# Patient Record
Sex: FEMALE | Race: WHITE | Employment: OTHER | URBAN - METROPOLITAN AREA
[De-identification: names, ages, dates, MRNs, and addresses within clinical notes are randomized per-mention and may not be internally consistent; named-entity substitution may affect disease eponyms.]

---

## 2017-01-04 ENCOUNTER — TELEPHONE (OUTPATIENT)
Dept: ENDOCRINOLOGY | Age: 47
End: 2017-01-04

## 2017-02-02 RX ORDER — ROSUVASTATIN CALCIUM 5 MG/1
TABLET, COATED ORAL
Qty: 15 TABLET | Refills: 2 | Status: SHIPPED | OUTPATIENT
Start: 2017-02-02 | End: 2017-05-10 | Stop reason: SDUPTHER

## 2017-03-20 ENCOUNTER — TELEPHONE (OUTPATIENT)
Dept: ENDOCRINOLOGY | Age: 47
End: 2017-03-20

## 2017-03-21 RX ORDER — GLIMEPIRIDE 4 MG/1
4 TABLET ORAL 2 TIMES DAILY
Qty: 60 TABLET | Refills: 3 | Status: SHIPPED | OUTPATIENT
Start: 2017-03-21 | End: 2018-06-13 | Stop reason: ALTCHOICE

## 2017-04-10 ENCOUNTER — OFFICE VISIT (OUTPATIENT)
Dept: ENDOCRINOLOGY | Age: 47
End: 2017-04-10

## 2017-04-10 VITALS
OXYGEN SATURATION: 96 % | WEIGHT: 258 LBS | HEIGHT: 65 IN | HEART RATE: 81 BPM | DIASTOLIC BLOOD PRESSURE: 72 MMHG | BODY MASS INDEX: 42.99 KG/M2 | RESPIRATION RATE: 16 BRPM | SYSTOLIC BLOOD PRESSURE: 119 MMHG

## 2017-04-10 DIAGNOSIS — E11.9 DIABETES MELLITUS WITHOUT COMPLICATION (HCC): Primary | ICD-10-CM

## 2017-04-10 LAB — HBA1C MFR BLD: 8 %

## 2017-04-10 PROCEDURE — 83036 HEMOGLOBIN GLYCOSYLATED A1C: CPT | Performed by: INTERNAL MEDICINE

## 2017-04-10 PROCEDURE — 99213 OFFICE O/P EST LOW 20 MIN: CPT | Performed by: INTERNAL MEDICINE

## 2017-05-09 RX ORDER — BLOOD-GLUCOSE METER
KIT MISCELLANEOUS
Qty: 150 STRIP | Refills: 10 | Status: SHIPPED | OUTPATIENT
Start: 2017-05-09 | End: 2018-04-06 | Stop reason: SDUPTHER

## 2017-05-10 RX ORDER — ROSUVASTATIN CALCIUM 5 MG/1
TABLET, COATED ORAL
Qty: 45 TABLET | Refills: 2 | Status: SHIPPED | OUTPATIENT
Start: 2017-05-10 | End: 2018-02-13 | Stop reason: SDUPTHER

## 2017-06-26 ENCOUNTER — OFFICE VISIT (OUTPATIENT)
Dept: ENT CLINIC | Age: 47
End: 2017-06-26

## 2017-06-26 VITALS
TEMPERATURE: 98.3 F | SYSTOLIC BLOOD PRESSURE: 120 MMHG | WEIGHT: 266 LBS | DIASTOLIC BLOOD PRESSURE: 79 MMHG | BODY MASS INDEX: 44.32 KG/M2 | HEIGHT: 65 IN

## 2017-06-26 DIAGNOSIS — M77.9 STYLOHYOID TENDONITIS: ICD-10-CM

## 2017-06-26 DIAGNOSIS — R07.0 THROAT PAIN: Primary | ICD-10-CM

## 2017-06-26 PROCEDURE — 99203 OFFICE O/P NEW LOW 30 MIN: CPT | Performed by: OTOLARYNGOLOGY

## 2018-02-03 LAB
AVERAGE GLUCOSE: NORMAL
HBA1C MFR BLD: 8.9 %

## 2018-02-13 RX ORDER — ROSUVASTATIN CALCIUM 5 MG/1
TABLET, COATED ORAL
Qty: 15 TABLET | Refills: 2 | Status: SHIPPED | OUTPATIENT
Start: 2018-02-13 | End: 2018-04-28 | Stop reason: SDUPTHER

## 2018-04-06 ENCOUNTER — OFFICE VISIT (OUTPATIENT)
Dept: ENDOCRINOLOGY | Age: 48
End: 2018-04-06

## 2018-04-06 VITALS
HEART RATE: 86 BPM | WEIGHT: 258.9 LBS | DIASTOLIC BLOOD PRESSURE: 76 MMHG | SYSTOLIC BLOOD PRESSURE: 111 MMHG | OXYGEN SATURATION: 97 % | BODY MASS INDEX: 40.55 KG/M2 | TEMPERATURE: 97.6 F

## 2018-04-06 DIAGNOSIS — I10 ESSENTIAL HYPERTENSION: ICD-10-CM

## 2018-04-06 DIAGNOSIS — E11.9 DIABETES MELLITUS WITHOUT COMPLICATION (HCC): Primary | ICD-10-CM

## 2018-04-06 LAB — HBA1C MFR BLD: 10.9 %

## 2018-04-06 PROCEDURE — 3046F HEMOGLOBIN A1C LEVEL >9.0%: CPT | Performed by: INTERNAL MEDICINE

## 2018-04-06 PROCEDURE — 83036 HEMOGLOBIN GLYCOSYLATED A1C: CPT | Performed by: INTERNAL MEDICINE

## 2018-04-06 PROCEDURE — 4004F PT TOBACCO SCREEN RCVD TLK: CPT | Performed by: INTERNAL MEDICINE

## 2018-04-06 PROCEDURE — G8427 DOCREV CUR MEDS BY ELIG CLIN: HCPCS | Performed by: INTERNAL MEDICINE

## 2018-04-06 PROCEDURE — 99215 OFFICE O/P EST HI 40 MIN: CPT | Performed by: INTERNAL MEDICINE

## 2018-04-06 PROCEDURE — G8417 CALC BMI ABV UP PARAM F/U: HCPCS | Performed by: INTERNAL MEDICINE

## 2018-04-06 RX ORDER — BLOOD-GLUCOSE METER
KIT MISCELLANEOUS
Qty: 1 DEVICE | Refills: 0 | Status: SHIPPED | OUTPATIENT
Start: 2018-04-06

## 2018-04-06 RX ORDER — LANCETS 28 GAUGE
EACH MISCELLANEOUS
Qty: 100 EACH | Refills: 3 | Status: SHIPPED | OUTPATIENT
Start: 2018-04-06

## 2018-04-30 RX ORDER — ROSUVASTATIN CALCIUM 5 MG/1
TABLET, COATED ORAL
Qty: 15 TABLET | Refills: 11 | Status: SHIPPED | OUTPATIENT
Start: 2018-04-30 | End: 2019-05-07 | Stop reason: SDUPTHER

## 2018-05-09 ENCOUNTER — HOSPITAL ENCOUNTER (OUTPATIENT)
Dept: OTHER | Age: 48
Discharge: HOME | End: 2018-05-09
Attending: NURSE PRACTITIONER | Admitting: NURSE PRACTITIONER

## 2018-05-09 ENCOUNTER — OFFICE VISIT (OUTPATIENT)
Dept: ENDOCRINOLOGY | Age: 48
End: 2018-05-09

## 2018-05-09 VITALS
HEIGHT: 67 IN | BODY MASS INDEX: 37.09 KG/M2 | WEIGHT: 236.3 LBS | DIASTOLIC BLOOD PRESSURE: 87 MMHG | HEART RATE: 90 BPM | OXYGEN SATURATION: 98 % | SYSTOLIC BLOOD PRESSURE: 132 MMHG

## 2018-05-09 DIAGNOSIS — E11.9 DIABETES MELLITUS WITHOUT COMPLICATION (HCC): Primary | ICD-10-CM

## 2018-05-09 DIAGNOSIS — I10 ESSENTIAL HYPERTENSION: ICD-10-CM

## 2018-05-09 DIAGNOSIS — E11.9 DIABETES MELLITUS WITHOUT COMPLICATION (HCC): ICD-10-CM

## 2018-05-09 DIAGNOSIS — E78.2 MIXED HYPERLIPIDEMIA: ICD-10-CM

## 2018-05-09 LAB
CREATININE URINE: 73.6 MG/DL (ref 28–259)
MICROALBUMIN UR-MCNC: <1.2 MG/DL
MICROALBUMIN/CREAT UR-RTO: NORMAL MG/G (ref 0–30)

## 2018-05-09 PROCEDURE — G8427 DOCREV CUR MEDS BY ELIG CLIN: HCPCS | Performed by: NURSE PRACTITIONER

## 2018-05-09 PROCEDURE — 99214 OFFICE O/P EST MOD 30 MIN: CPT | Performed by: NURSE PRACTITIONER

## 2018-05-09 PROCEDURE — 4004F PT TOBACCO SCREEN RCVD TLK: CPT | Performed by: NURSE PRACTITIONER

## 2018-05-09 PROCEDURE — 3046F HEMOGLOBIN A1C LEVEL >9.0%: CPT | Performed by: NURSE PRACTITIONER

## 2018-05-09 PROCEDURE — G8417 CALC BMI ABV UP PARAM F/U: HCPCS | Performed by: NURSE PRACTITIONER

## 2018-05-09 PROCEDURE — 2022F DILAT RTA XM EVC RTNOPTHY: CPT | Performed by: NURSE PRACTITIONER

## 2018-05-09 ASSESSMENT — ENCOUNTER SYMPTOMS
CONSTIPATION: 0
BACK PAIN: 1
DIARRHEA: 0
COLOR CHANGE: 0
EYE PAIN: 0
SHORTNESS OF BREATH: 0
NAUSEA: 0

## 2018-05-10 ENCOUNTER — TELEPHONE (OUTPATIENT)
Dept: ENDOCRINOLOGY | Age: 48
End: 2018-05-10

## 2018-05-11 ENCOUNTER — TELEPHONE (OUTPATIENT)
Dept: ENDOCRINOLOGY | Age: 48
End: 2018-05-11

## 2018-05-11 DIAGNOSIS — E11.9 DIABETES MELLITUS WITHOUT COMPLICATION (HCC): Primary | ICD-10-CM

## 2018-06-12 RX ORDER — ACETAMINOPHEN 325 MG/1
650 TABLET ORAL
COMMUNITY
Start: 2018-02-04 | End: 2018-06-13 | Stop reason: ALTCHOICE

## 2018-06-12 RX ORDER — CEPHALEXIN 500 MG/1
CAPSULE ORAL
COMMUNITY
Start: 2018-03-05 | End: 2018-06-13 | Stop reason: ALTCHOICE

## 2018-06-12 RX ORDER — AMOXICILLIN 250 MG
1-3 CAPSULE ORAL
COMMUNITY
Start: 2018-02-04 | End: 2018-06-13 | Stop reason: ALTCHOICE

## 2018-06-12 RX ORDER — TRAMADOL HYDROCHLORIDE 50 MG/1
50 TABLET ORAL PRN
COMMUNITY
Start: 2018-04-18 | End: 2018-08-08

## 2018-06-13 ENCOUNTER — OFFICE VISIT (OUTPATIENT)
Dept: ENDOCRINOLOGY | Age: 48
End: 2018-06-13

## 2018-06-13 VITALS
OXYGEN SATURATION: 97 % | BODY MASS INDEX: 40.74 KG/M2 | WEIGHT: 259.6 LBS | HEART RATE: 92 BPM | DIASTOLIC BLOOD PRESSURE: 80 MMHG | HEIGHT: 67 IN | SYSTOLIC BLOOD PRESSURE: 124 MMHG

## 2018-06-13 DIAGNOSIS — E11.9 DIABETES MELLITUS WITHOUT COMPLICATION (HCC): ICD-10-CM

## 2018-06-13 DIAGNOSIS — E55.9 VITAMIN D DEFICIENCY: ICD-10-CM

## 2018-06-13 DIAGNOSIS — E78.2 MIXED HYPERLIPIDEMIA: ICD-10-CM

## 2018-06-13 DIAGNOSIS — E66.01 MORBID OBESITY (HCC): Primary | ICD-10-CM

## 2018-06-13 PROCEDURE — G8417 CALC BMI ABV UP PARAM F/U: HCPCS | Performed by: NURSE PRACTITIONER

## 2018-06-13 PROCEDURE — 96160 PT-FOCUSED HLTH RISK ASSMT: CPT | Performed by: NURSE PRACTITIONER

## 2018-06-13 PROCEDURE — 2022F DILAT RTA XM EVC RTNOPTHY: CPT | Performed by: NURSE PRACTITIONER

## 2018-06-13 PROCEDURE — 99214 OFFICE O/P EST MOD 30 MIN: CPT | Performed by: NURSE PRACTITIONER

## 2018-06-13 PROCEDURE — G8427 DOCREV CUR MEDS BY ELIG CLIN: HCPCS | Performed by: NURSE PRACTITIONER

## 2018-06-13 PROCEDURE — 4004F PT TOBACCO SCREEN RCVD TLK: CPT | Performed by: NURSE PRACTITIONER

## 2018-06-13 PROCEDURE — 3046F HEMOGLOBIN A1C LEVEL >9.0%: CPT | Performed by: NURSE PRACTITIONER

## 2018-06-13 RX ORDER — PHENTERMINE HYDROCHLORIDE 37.5 MG/1
37.5 TABLET ORAL
Qty: 30 TABLET | Refills: 0 | Status: SHIPPED | OUTPATIENT
Start: 2018-06-13 | End: 2018-07-06 | Stop reason: SDUPTHER

## 2018-06-13 ASSESSMENT — PATIENT HEALTH QUESTIONNAIRE - PHQ9
8. MOVING OR SPEAKING SO SLOWLY THAT OTHER PEOPLE COULD HAVE NOTICED. OR THE OPPOSITE, BEING SO FIGETY OR RESTLESS THAT YOU HAVE BEEN MOVING AROUND A LOT MORE THAN USUAL: 2
6. FEELING BAD ABOUT YOURSELF - OR THAT YOU ARE A FAILURE OR HAVE LET YOURSELF OR YOUR FAMILY DOWN: 3
5. POOR APPETITE OR OVEREATING: 2
3. TROUBLE FALLING OR STAYING ASLEEP: 3
SUM OF ALL RESPONSES TO PHQ9 QUESTIONS 1 & 2: 4
2. FEELING DOWN, DEPRESSED OR HOPELESS: 3
9. THOUGHTS THAT YOU WOULD BE BETTER OFF DEAD, OR OF HURTING YOURSELF: 0
1. LITTLE INTEREST OR PLEASURE IN DOING THINGS: 1
4. FEELING TIRED OR HAVING LITTLE ENERGY: 3
10. IF YOU CHECKED OFF ANY PROBLEMS, HOW DIFFICULT HAVE THESE PROBLEMS MADE IT FOR YOU TO DO YOUR WORK, TAKE CARE OF THINGS AT HOME, OR GET ALONG WITH OTHER PEOPLE: 2
SUM OF ALL RESPONSES TO PHQ QUESTIONS 1-9: 17
7. TROUBLE CONCENTRATING ON THINGS, SUCH AS READING THE NEWSPAPER OR WATCHING TELEVISION: 0

## 2018-06-13 ASSESSMENT — ENCOUNTER SYMPTOMS
EYE PAIN: 0
BACK PAIN: 1
CONSTIPATION: 0
NAUSEA: 0
DIARRHEA: 0
SHORTNESS OF BREATH: 0
COLOR CHANGE: 0

## 2018-07-06 ENCOUNTER — TELEPHONE (OUTPATIENT)
Dept: ENDOCRINOLOGY | Age: 48
End: 2018-07-06

## 2018-07-06 ENCOUNTER — OFFICE VISIT (OUTPATIENT)
Dept: ENDOCRINOLOGY | Age: 48
End: 2018-07-06

## 2018-07-06 VITALS
HEART RATE: 87 BPM | SYSTOLIC BLOOD PRESSURE: 116 MMHG | DIASTOLIC BLOOD PRESSURE: 71 MMHG | WEIGHT: 256 LBS | BODY MASS INDEX: 40.1 KG/M2 | OXYGEN SATURATION: 97 %

## 2018-07-06 DIAGNOSIS — E66.01 MORBID OBESITY (HCC): ICD-10-CM

## 2018-07-06 DIAGNOSIS — E11.9 DIABETES MELLITUS WITHOUT COMPLICATION (HCC): Primary | ICD-10-CM

## 2018-07-06 DIAGNOSIS — E78.2 MIXED HYPERLIPIDEMIA: ICD-10-CM

## 2018-07-06 LAB — HBA1C MFR BLD: 11.1 %

## 2018-07-06 PROCEDURE — 83036 HEMOGLOBIN GLYCOSYLATED A1C: CPT | Performed by: INTERNAL MEDICINE

## 2018-07-06 PROCEDURE — G8427 DOCREV CUR MEDS BY ELIG CLIN: HCPCS | Performed by: INTERNAL MEDICINE

## 2018-07-06 PROCEDURE — G8417 CALC BMI ABV UP PARAM F/U: HCPCS | Performed by: INTERNAL MEDICINE

## 2018-07-06 PROCEDURE — 4004F PT TOBACCO SCREEN RCVD TLK: CPT | Performed by: INTERNAL MEDICINE

## 2018-07-06 PROCEDURE — 2022F DILAT RTA XM EVC RTNOPTHY: CPT | Performed by: INTERNAL MEDICINE

## 2018-07-06 PROCEDURE — 99214 OFFICE O/P EST MOD 30 MIN: CPT | Performed by: INTERNAL MEDICINE

## 2018-07-06 PROCEDURE — 3046F HEMOGLOBIN A1C LEVEL >9.0%: CPT | Performed by: INTERNAL MEDICINE

## 2018-07-06 RX ORDER — PHENTERMINE HYDROCHLORIDE 37.5 MG/1
37.5 TABLET ORAL
Qty: 30 TABLET | Refills: 0 | Status: SHIPPED | OUTPATIENT
Start: 2018-07-06 | End: 2018-08-05

## 2018-07-06 NOTE — TELEPHONE ENCOUNTER
Pt called said pharmacy said they still haven't recieved the adipex prescription please send to [facility] in [place]

## 2018-07-06 NOTE — PROGRESS NOTES
mouth daily      hydrochlorothiazide (HYDRODIURIL) 50 MG tablet Take 50 mg by mouth daily      buPROPion 150 MG SR tablet       fluocinonide (LIDEX) 0.05 % ointment Apply sparingly to affected area(s) up to bid prn 60 g 0    lisinopril (PRINIVIL;ZESTRIL) 40 MG tablet Take 1 tablet by mouth daily 30 tablet 3         Vitals:    07/06/18 1300   BP: 116/71   Site: Right Arm   Position: Sitting   Cuff Size: Large Adult   Pulse: 87   SpO2: 97%   Weight: 256 lb (116.1 kg)     Body mass index is 40.1 kg/m². Wt Readings from Last 3 Encounters:   07/06/18 256 lb (116.1 kg)   06/13/18 259 lb 9.6 oz (117.8 kg)   05/09/18 236 lb 4.8 oz (107.2 kg)     BP Readings from Last 3 Encounters:   07/06/18 116/71   06/13/18 124/80   05/09/18 132/87        Past Medical History:   Diagnosis Date    Anxiety     Hypertension     Ovarian cyst     Type II or unspecified type diabetes mellitus without mention of complication, not stated as uncontrolled      Past Surgical History:   Procedure Laterality Date    APPENDECTOMY      CHOLECYSTECTOMY      DILATION AND CURETTAGE OF UTERUS      ENDOMETRIAL ABLATION      MIDDLE EAR SURGERY  10/2002    left ossicular reconstrucion    TUBAL LIGATION       History reviewed. No pertinent family history. History   Smoking Status    Current Every Day Smoker    Packs/day: 1.00   Smokeless Tobacco    Never Used      History   Alcohol Use    Yes     Comment: rare       HPI      Tisha Flores is a 57 y.o. female who is here for a follow-up for management of DM. She was previously living in [place], [place]. Patient has a PMH of Type 2 DM, hypertension, hyperlipidemia, obesity. Diagnosed with Diabetes Mellitus type 2 in [id number]. Course has been variable . Microvascular complications: No known retinopathy (Last eye exam:  2016), No nephropathy .    No Peripheral neuropathy    Home regimen:  Amaryl 4 mg BID    basaglar 46 units at night  Novolog 15 units TID + SSI Previous medications: Metformin /jannmet ( diarrhea). Byetta ( abdominal pain). Amaryl 8 mg daily. Tolerated Bydureon without side effects. Victoza ( GI side effects)    -400      Diet: Eats 3  Meals/day. Had Nutrition education in 2014. Has decreased the soda. Exercise: getting PT for back pain. Hyperlipidemia: Currently is on pravastatin 40 mg daily. Hypertension: Currently on lisinopril 40 mg daily. Review of Systems   Constitutional: Positive for malaise/fatigue. Negative for fever, chills, weight loss and diaphoresis. Eyes: Negative for blurred vision, double vision and photophobia. Respiratory: Negative for cough and hemoptysis. Cardiovascular: Negative for chest pain, palpitations and orthopnea. Genitourinary: Negative for dysuria, urgency, frequency, hematuria and flank pain. Musculoskeletal: Positive for myalgias. Negative for back pain, joint pain, falls and neck pain. Skin: Negative for itching and rash. Neurological: Positive for headaches. Negative for dizziness, tingling, tremors, sensory change, speech change, focal weakness, seizures and loss of consciousness. Endo/Heme/Allergies: Negative for environmental allergies and polydipsia. Does not bruise/bleed easily. Psychiatric/Behavioral: Negative for depression, suicidal ideas, hallucinations, memory loss and substance abuse. The patient is not nervous/anxious and does not have insomnia. Physical Exam   Constitutional: She is oriented to person, place, and time. She appears well-developed and well-nourished. HENT:   Head: Normocephalic. Mouth/Throat: Oropharynx is clear and moist.   Eyes: EOM are normal. Right eye exhibits no discharge. Neck: No thyromegaly present. Cardiovascular: Normal rate and normal heart sounds. Pulmonary/Chest: Effort normal and breath sounds normal.   Abdominal: Soft. She exhibits no distension. There is no tenderness.    Musculoskeletal: She exhibits no tenderness. Neurological: She is alert and oriented to person, place, and time. Skin: Skin is warm. No rash noted. She is not diaphoretic. Psychiatric: Her behavior is normal.             Office Visit on 07/06/2018   Component Date Value Ref Range Status    Hemoglobin A1C 07/06/2018 11.1  % Final        Assessment/Plan    1. Type 2 DM     López Massey is a 57 y.o. female has Type 2 DM with obesity and insulin resistance. Poorly controlled. A1c 9.7 % --->9.2 % ---> 8.4 % --->7.4 % ---> 7.8 %---> 8.5 %-->9.8 %---> 8 %---> 10.9 % ---> 11.1 %          She stopped drinking soda but has started drinking lemonade ! Again discussed that without her not sticking to low sugar diet. Advised to stop drinking all forms of sugary drinks    -Continue amaryl 4 mg BID   -Increase basaglar 55 units at night  -Increase Novolog 18 units TID with meals + SSI  -     Advised follow-up with the ophthalmologist once year. Urine microalbumin/cr ratio was normal in 02/15, 12/15, 09/16. Discussed foot care. Foot exam was done by PCP in 12/14. ASA 81 mg daily. Smoker. Discussed smoking cessation. 2. Hypertension. BP at goal    3. Hyperlipidemia. On pravastatin  20 mg daily. Lipids at goal ( 09/16)    4. Psoriasis. As per   Dr. Vandana Fabian. 5. Neuropathy. Used neurontin but had headaches. On Lyrica 50 mg BID       6. Obesity. On adipex. Lost 3 lbs. Will follow-up.

## 2018-07-09 ENCOUNTER — HOSPITAL ENCOUNTER (OUTPATIENT)
Dept: GENERAL RADIOLOGY | Age: 48
Discharge: HOME | End: 2018-07-09
Attending: NURSE PRACTITIONER | Admitting: NURSE PRACTITIONER

## 2018-07-09 DIAGNOSIS — E11.9 DIABETES MELLITUS WITHOUT COMPLICATION (HCC): ICD-10-CM

## 2018-07-09 DIAGNOSIS — E78.2 MIXED HYPERLIPIDEMIA: ICD-10-CM

## 2018-07-09 DIAGNOSIS — E66.01 MORBID OBESITY (HCC): ICD-10-CM

## 2018-07-09 DIAGNOSIS — E55.9 VITAMIN D DEFICIENCY: ICD-10-CM

## 2018-07-09 LAB
A/G RATIO: 1.1 (ref 1.1–2.2)
ALBUMIN SERPL-MCNC: 3.5 G/DL (ref 3.4–5)
ALP BLD-CCNC: 109 U/L (ref 40–129)
ALT SERPL-CCNC: 16 U/L (ref 10–40)
ANION GAP SERPL CALCULATED.3IONS-SCNC: 11 MMOL/L (ref 3–16)
AST SERPL-CCNC: 16 U/L (ref 15–37)
BILIRUB SERPL-MCNC: 0.3 MG/DL (ref 0–1)
BUN BLDV-MCNC: 9 MG/DL (ref 7–20)
CALCIUM SERPL-MCNC: 9.3 MG/DL (ref 8.3–10.6)
CHLORIDE BLD-SCNC: 106 MMOL/L (ref 99–110)
CHOLESTEROL, TOTAL: 126 MG/DL (ref 0–199)
CO2: 27 MMOL/L (ref 21–32)
CREAT SERPL-MCNC: <0.5 MG/DL (ref 0.6–1.1)
GFR AFRICAN AMERICAN: >60
GFR NON-AFRICAN AMERICAN: >60
GLOBULIN: 3.3 G/DL
GLUCOSE BLD-MCNC: 118 MG/DL (ref 70–99)
HDLC SERPL-MCNC: 39 MG/DL (ref 40–60)
LDL CHOLESTEROL CALCULATED: 59 MG/DL
POTASSIUM SERPL-SCNC: 4.4 MMOL/L (ref 3.5–5.1)
SODIUM BLD-SCNC: 144 MMOL/L (ref 136–145)
TOTAL PROTEIN: 6.8 G/DL (ref 6.4–8.2)
TRIGL SERPL-MCNC: 142 MG/DL (ref 0–150)
TSH REFLEX FT4: 3.84 UIU/ML (ref 0.27–4.2)
VITAMIN D 25-HYDROXY: 16.8 NG/ML
VLDLC SERPL CALC-MCNC: 28 MG/DL

## 2018-07-11 LAB — C-PEPTIDE: 2.1 NG/ML (ref 1.1–4.4)

## 2018-07-19 ENCOUNTER — TELEPHONE (OUTPATIENT)
Dept: ENDOCRINOLOGY | Age: 48
End: 2018-07-19

## 2018-07-19 DIAGNOSIS — E55.9 VITAMIN D DEFICIENCY: Primary | ICD-10-CM

## 2018-07-19 RX ORDER — ERGOCALCIFEROL (VITAMIN D2) 1250 MCG
50000 CAPSULE ORAL WEEKLY
Qty: 12 CAPSULE | Refills: 1 | Status: SHIPPED | OUTPATIENT
Start: 2018-07-19 | End: 2019-01-11 | Stop reason: SDUPTHER

## 2018-07-30 RX ORDER — INSULIN LISPRO 100 U/ML
18-26 INJECTION, SOLUTION SUBCUTANEOUS
Qty: 30 ML | Refills: 3 | Status: SHIPPED | OUTPATIENT
Start: 2018-07-30 | End: 2019-01-25 | Stop reason: SDUPTHER

## 2018-07-30 RX ORDER — INSULIN LISPRO 100 U/ML
INJECTION, SOLUTION SUBCUTANEOUS
COMMUNITY
End: 2018-07-30 | Stop reason: SDUPTHER

## 2018-08-08 ENCOUNTER — OFFICE VISIT (OUTPATIENT)
Dept: ENDOCRINOLOGY | Age: 48
End: 2018-08-08

## 2018-08-08 VITALS
HEART RATE: 90 BPM | DIASTOLIC BLOOD PRESSURE: 78 MMHG | WEIGHT: 253.6 LBS | BODY MASS INDEX: 39.8 KG/M2 | SYSTOLIC BLOOD PRESSURE: 128 MMHG | HEIGHT: 67 IN | OXYGEN SATURATION: 96 %

## 2018-08-08 DIAGNOSIS — E11.9 DIABETES MELLITUS WITHOUT COMPLICATION (HCC): Primary | ICD-10-CM

## 2018-08-08 DIAGNOSIS — E55.9 VITAMIN D DEFICIENCY: ICD-10-CM

## 2018-08-08 DIAGNOSIS — I10 ESSENTIAL HYPERTENSION: ICD-10-CM

## 2018-08-08 DIAGNOSIS — F17.200 TOBACCO DEPENDENCE: ICD-10-CM

## 2018-08-08 DIAGNOSIS — E78.2 MIXED HYPERLIPIDEMIA: ICD-10-CM

## 2018-08-08 DIAGNOSIS — E66.01 MORBID OBESITY (HCC): ICD-10-CM

## 2018-08-08 PROCEDURE — G8417 CALC BMI ABV UP PARAM F/U: HCPCS | Performed by: NURSE PRACTITIONER

## 2018-08-08 PROCEDURE — 2022F DILAT RTA XM EVC RTNOPTHY: CPT | Performed by: NURSE PRACTITIONER

## 2018-08-08 PROCEDURE — 96160 PT-FOCUSED HLTH RISK ASSMT: CPT | Performed by: NURSE PRACTITIONER

## 2018-08-08 PROCEDURE — G8427 DOCREV CUR MEDS BY ELIG CLIN: HCPCS | Performed by: NURSE PRACTITIONER

## 2018-08-08 PROCEDURE — 3046F HEMOGLOBIN A1C LEVEL >9.0%: CPT | Performed by: NURSE PRACTITIONER

## 2018-08-08 PROCEDURE — 99214 OFFICE O/P EST MOD 30 MIN: CPT | Performed by: NURSE PRACTITIONER

## 2018-08-08 PROCEDURE — 4004F PT TOBACCO SCREEN RCVD TLK: CPT | Performed by: NURSE PRACTITIONER

## 2018-08-08 RX ORDER — PHENTERMINE HYDROCHLORIDE 37.5 MG/1
37.5 CAPSULE ORAL EVERY MORNING
Qty: 30 CAPSULE | Refills: 0 | Status: SHIPPED | OUTPATIENT
Start: 2018-08-08 | End: 2018-09-07

## 2018-08-08 ASSESSMENT — PATIENT HEALTH QUESTIONNAIRE - PHQ9
5. POOR APPETITE OR OVEREATING: 0
2. FEELING DOWN, DEPRESSED OR HOPELESS: 2
SUM OF ALL RESPONSES TO PHQ9 QUESTIONS 1 & 2: 4
7. TROUBLE CONCENTRATING ON THINGS, SUCH AS READING THE NEWSPAPER OR WATCHING TELEVISION: 0
SUM OF ALL RESPONSES TO PHQ QUESTIONS 1-9: 9
4. FEELING TIRED OR HAVING LITTLE ENERGY: 2
10. IF YOU CHECKED OFF ANY PROBLEMS, HOW DIFFICULT HAVE THESE PROBLEMS MADE IT FOR YOU TO DO YOUR WORK, TAKE CARE OF THINGS AT HOME, OR GET ALONG WITH OTHER PEOPLE: 1
6. FEELING BAD ABOUT YOURSELF - OR THAT YOU ARE A FAILURE OR HAVE LET YOURSELF OR YOUR FAMILY DOWN: 1
9. THOUGHTS THAT YOU WOULD BE BETTER OFF DEAD, OR OF HURTING YOURSELF: 0
1. LITTLE INTEREST OR PLEASURE IN DOING THINGS: 2
8. MOVING OR SPEAKING SO SLOWLY THAT OTHER PEOPLE COULD HAVE NOTICED. OR THE OPPOSITE, BEING SO FIGETY OR RESTLESS THAT YOU HAVE BEEN MOVING AROUND A LOT MORE THAN USUAL: 0
3. TROUBLE FALLING OR STAYING ASLEEP: 2
SUM OF ALL RESPONSES TO PHQ QUESTIONS 1-9: 9

## 2018-08-08 ASSESSMENT — ENCOUNTER SYMPTOMS
CONSTIPATION: 0
SHORTNESS OF BREATH: 0
EYE PAIN: 0
COLOR CHANGE: 0
DIARRHEA: 0
BACK PAIN: 1
NAUSEA: 0

## 2018-08-08 NOTE — PROGRESS NOTES
Pt returned call, left me a vm. I called him back and lvm about there was a counter reaction with the medication he was going to send in for his rash. He sent in Lotrisone cream instead.    09/14/2016    .4 12/10/2015    .7 01/12/2015     Current Medication regimen:   Lantus 55 units QHS --> takes every night and continues to titrate  Novolog 18 units TID with meals + moderate dose regimen SSI--> takes 3 times a day    Other meds tried:  Metformin and janumet, victoza-> has severe abdominal pain and GI s/e: diarrhea, abdominal cramps and nausea. Microvascular Complications:  · Neuropathy Tingling and burning sensation in the toes, on a different  Muscle relaxant. · Retinopathy : blurry vision improved. Wears glasses at night to drive  · Nephropathy Urine microalbumin/cr ratio was normal in 02/15, 12/15, 09/16. Component    Latest Ref Rng & Units 5/9/2018   Microalbumin, Random Urine     <2.0 mg/dL <1.20   Creatinine, Ur     28.0 - 259.0 mg/dL 73.6   Microalbumin Creatinine Ratio     0.0 - 30.0 mg/g see below     Diabetic Health Maintenance   · Last Eye Exam: 2016; scheduled for next visit next month  · Last Foot exam: 2014   · Has patient seen a dietitian? Yes, several years ago  · Current Exercise: No structured exercise due to back pain, can walk for only 15 minutes before legs feel numb. · On ACEI or ARB:lisinopril 40 mg  · On statin: Crestor 5mg    Risk Factors  · Smoker: current everyday 1ppd smoker  · ETOH: rare  · Co-morbid conditions HTN, HLD, morbid obesity    Hyperlipidemia: Currently is on Crestor 5 mg. Current complaints include occasional myalgias but otherwise tolerates well.   Lab Results   Component Value Date    CHOL 126 07/09/2018    CHOL 153 09/14/2016    CHOL 128 12/10/2015     Lab Results   Component Value Date    TRIG 142 07/09/2018    TRIG 127 09/14/2016    TRIG 114 12/10/2015     Lab Results   Component Value Date    HDL 39 07/09/2018    HDL 46 09/14/2016    HDL 36 12/10/2015     Lab Results   Component Value Date    LDLCALC 59 07/09/2018    LDLCALC 82 09/14/2016    LDLCALC 69 12/10/2015     No results found for: LDLDIRECT  No results found for: CHOLHDLRATIO    Vitamin D deficiency: Currently is on no supplementation. Current complaints include fatigue on daily basis. Last vitamin D level is:  Lab Results   Component Value Date    VITD25 16.8 07/09/2018     Hypertension  Currently on  Lisinopril 40 mg, HCTZ 50mg. Controlled , denies symptoms of dizziness, light headedness. Occasional dependent edema. Tries to follow a salt restricted diet. Lab Results   Component Value Date     07/09/2018    K 4.4 07/09/2018     07/09/2018    CO2 27 07/09/2018    BUN 9 07/09/2018    CREATININE <0.5 (L) 07/09/2018    GLUCOSE 118 (H) 07/09/2018    CALCIUM 9.3 07/09/2018    PROT 6.8 07/09/2018    LABALBU 3.5 07/09/2018    BILITOT 0.3 07/09/2018    ALKPHOS 109 07/09/2018    AST 16 07/09/2018    ALT 16 07/09/2018    LABGLOM >60 07/09/2018    GFRAA >60 07/09/2018    AGRATIO 1.1 07/09/2018    GLOB 3.3 07/09/2018     The ASCVD Risk score (Higinio Simmons., et al., 2013) failed to calculate for the following reasons: The valid total cholesterol range is 130 to 320 mg/dL    Past Medical History:   Diagnosis Date    Anxiety     Hypertension     Ovarian cyst     Type II or unspecified type diabetes mellitus without mention of complication, not stated as uncontrolled      History reviewed. No pertinent family history. Review of Systems   Constitutional: Positive for fatigue. Negative for activity change, appetite change, diaphoresis, fever and unexpected weight change. HENT: Negative for dental problem. Eyes: Negative for pain and visual disturbance. Respiratory: Negative for shortness of breath. Cardiovascular: Negative for chest pain, palpitations and leg swelling. Gastrointestinal: Negative for constipation, diarrhea and nausea. Endocrine: Positive for polydipsia, polyphagia and polyuria. Negative for cold intolerance and heat intolerance. Genitourinary: Negative for frequency and urgency.    Musculoskeletal: Positive for back pain and neck complication (HCC) - Primary     Increase Lantus at night to 55 Units  Continue Humalog 18 units with sliding scale   If sugars spike after steroid shot, check blood glucose tamie 4 hours and correct with sliding scale only    Discussed low carb meal replacements  No further high carb beverages now used  A1c goal is < 8 at next visit         Relevant Medications    phentermine 37.5 MG capsule    Other Relevant Orders     DIABETES FOOT EXAM (Completed)    Hyperlipidemia     LDL and Trigs are within ref range  Continue current management         Relevant Medications    phentermine 37.5 MG capsule    Hypertension     Blood pressure controlled. Continue current regimen         Morbid obesity (HCC)     Lost 10 lbs in 2 months  BMI is 39.72  Finds hard to exercise due to back issues         Relevant Medications    phentermine 37.5 MG capsule    Tobacco dependence     Continues at 1 ppd   Does not want to cut down but will think about it         Vitamin D deficiency     On 50 K IU Qweekly           Counseled patient on the areas above for > 45 minutes  Return in about 3 months (around 11/8/2018).

## 2018-08-08 NOTE — PATIENT INSTRUCTIONS
Increase Lantus at night to 55 Units  Humalog 18 units with sliding scale   If sugars spike after steroid shot, check blood glucose tamie 4 hours and correct with sliding scale only

## 2018-09-06 ENCOUNTER — TELEPHONE (OUTPATIENT)
Dept: ENDOCRINOLOGY | Age: 48
End: 2018-09-06

## 2018-09-06 RX ORDER — PHENTERMINE HYDROCHLORIDE 37.5 MG/1
TABLET ORAL
Qty: 30 TABLET | Refills: 0 | OUTPATIENT
Start: 2018-09-06

## 2018-10-19 ENCOUNTER — OFFICE VISIT (OUTPATIENT)
Dept: ENDOCRINOLOGY | Age: 48
End: 2018-10-19
Payer: COMMERCIAL

## 2018-10-19 VITALS
HEIGHT: 67 IN | DIASTOLIC BLOOD PRESSURE: 80 MMHG | RESPIRATION RATE: 16 BRPM | SYSTOLIC BLOOD PRESSURE: 128 MMHG | HEART RATE: 83 BPM | BODY MASS INDEX: 40.62 KG/M2 | OXYGEN SATURATION: 94 % | WEIGHT: 258.8 LBS

## 2018-10-19 DIAGNOSIS — E11.9 DIABETES MELLITUS WITHOUT COMPLICATION (HCC): Primary | ICD-10-CM

## 2018-10-19 DIAGNOSIS — I10 ESSENTIAL HYPERTENSION: ICD-10-CM

## 2018-10-19 DIAGNOSIS — E78.2 MIXED HYPERLIPIDEMIA: ICD-10-CM

## 2018-10-19 LAB — HBA1C MFR BLD: 11 %

## 2018-10-19 PROCEDURE — 3046F HEMOGLOBIN A1C LEVEL >9.0%: CPT | Performed by: INTERNAL MEDICINE

## 2018-10-19 PROCEDURE — 2022F DILAT RTA XM EVC RTNOPTHY: CPT | Performed by: INTERNAL MEDICINE

## 2018-10-19 PROCEDURE — 83036 HEMOGLOBIN GLYCOSYLATED A1C: CPT | Performed by: INTERNAL MEDICINE

## 2018-10-19 PROCEDURE — 4004F PT TOBACCO SCREEN RCVD TLK: CPT | Performed by: INTERNAL MEDICINE

## 2018-10-19 PROCEDURE — 99214 OFFICE O/P EST MOD 30 MIN: CPT | Performed by: INTERNAL MEDICINE

## 2018-10-19 PROCEDURE — G8484 FLU IMMUNIZE NO ADMIN: HCPCS | Performed by: INTERNAL MEDICINE

## 2018-10-19 PROCEDURE — G8427 DOCREV CUR MEDS BY ELIG CLIN: HCPCS | Performed by: INTERNAL MEDICINE

## 2018-10-19 PROCEDURE — G8417 CALC BMI ABV UP PARAM F/U: HCPCS | Performed by: INTERNAL MEDICINE

## 2018-10-19 NOTE — PROGRESS NOTES
Endocrinology  Mendy Adams M.D. Phone: [phone]   FAX: [phone]       Javad Dickey   YOB: 1970    Date of Visit:  10/19/2018    Allergies   Allergen Reactions    Latex Hives    Codeine      Upsets her abd     Contrast [Iodides]     Janumet [Sitagliptin-Metformin Hcl] Other (See Comments)     URI    Metformin And Related Diarrhea    Morphine And Related     Other      Dye   Dye     Dilaudid [Hydromorphone Hcl] Nausea And Vomiting     Severe illness    Hydromorphone Nausea And Vomiting     Severe illness  Nausea     Morphine Nausea And Vomiting and Other (See Comments)     Headache and sweats     Outpatient Prescriptions Marked as Taking for the 10/19/18 encounter (Office Visit) with Robert Barnhart MD   Medication Sig Dispense Refill    insulin glargine (BASAGLAR KWIKPEN) 100 UNIT/ML injection pen Inject 55 Units into the skin nightly 30 mL 2    ADMELOG SOLOSTAR 100 UNIT/ML pen Inject 18-26 Units into the skin 3 times daily (before meals) 30 mL 3    rosuvastatin (CRESTOR) 5 MG tablet TAKE ONE TABLET BY MOUTH EVERY OTHER DAY 15 tablet 11    Insulin Pen Needle (B-D UF III MINI PEN NEEDLES) 31G X 5 MM MISC 1 each by Does not apply route daily 100 each 6    glucose blood VI test strips (FREESTYLE LITE) strip Test BS 3 times a day. On insulin Dx Code E11.9 100 strip 10    FREESTYLE LANCETS MISC Test BS 3 times a day. On insulin Dx Code E11.9 100 each 3    sertraline (ZOLOFT) 100 MG tablet Take 100 mg by mouth 2 times daily       fluocinonide (LIDEX) 0.05 % ointment Apply sparingly to affected area(s) up to bid prn 60 g 0    lisinopril (PRINIVIL;ZESTRIL) 40 MG tablet Take 1 tablet by mouth daily 30 tablet 3         Vitals:    10/19/18 0850   BP: 128/80   Site: Right Lower Arm   Position: Sitting   Cuff Size: Medium Adult   Pulse: 83   Resp: 16   SpO2: 94%   Weight: 258 lb 12.8 oz (117.4 kg)   Height: 5' 7\" (1.702 m)     Body mass index is 40.53 kg/m².      Wt Readings

## 2018-10-29 ENCOUNTER — OFFICE VISIT (OUTPATIENT)
Dept: DERMATOLOGY | Age: 48
End: 2018-10-29
Payer: COMMERCIAL

## 2018-10-29 DIAGNOSIS — L40.0 PLAQUE PSORIASIS: Primary | ICD-10-CM

## 2018-10-29 PROCEDURE — 99213 OFFICE O/P EST LOW 20 MIN: CPT | Performed by: DERMATOLOGY

## 2018-10-29 PROCEDURE — G8484 FLU IMMUNIZE NO ADMIN: HCPCS | Performed by: DERMATOLOGY

## 2018-10-29 PROCEDURE — 4004F PT TOBACCO SCREEN RCVD TLK: CPT | Performed by: DERMATOLOGY

## 2018-10-29 PROCEDURE — G8427 DOCREV CUR MEDS BY ELIG CLIN: HCPCS | Performed by: DERMATOLOGY

## 2018-10-29 PROCEDURE — G8417 CALC BMI ABV UP PARAM F/U: HCPCS | Performed by: DERMATOLOGY

## 2018-10-29 NOTE — PATIENT INSTRUCTIONS
For your well being, we encourage you to stop smoking or using tobacco products. Smoking and the use of other tobacco products, including cigars and smokeless tobacco, causes or worsens numerous diseases and conditions. Some products also expose nearby people to toxic secondhand smoke. Smoking is the leading cause of preventable death in the [place] causing over 438,000 deaths per year. Secondhand smoke is a serious health hazard for people of all ages, causing more than 41,000 deaths each year. Marijuana smoke contains many of the same toxins, irritants and carcinogens as tobacco smoke. Electronic cigarettes are a new tobacco product, and the potential health consequences and safety of these products are unknown. Smokeless Tobacco products are a known cause of cancer, and are not a safe alternative to cigarettes. 1. Make the decision to quit smoking  Stopping smoking is the best thing you can do for your health. If you smoke, you are more likely to get diseases of the lungs, heart, and brain. You are also more likely to get many kinds of cancer. After you quit, you will be less likely to get these diseases. Stopping smoking is hard--but you can do it! Your doctor can help you. 2. Get ready to quit  Once you decide to quit, make a plan with the help of your doctor. Here are some things you need to do:  Choose a day to quit. Talk to your primary care doctor about using the nicotine patch, gum, inhaler, or nasal spray to help you quit smoking. Getting nicotine some way other than in a cigarette can help make quitting easier. Talk to your primary care doctor about using a prescription medicine like bupropion (brand name: Zyban) to reduce your urge to smoke. If you decide to use a medicine, start taking it two weeks before your quit day. Talk with your primary care doctor about when you smoke. For example, you may smoke first thing in the morning, after a meal, or when you feel stressed.  Plan

## 2018-12-03 RX ORDER — PEN NEEDLE, DIABETIC 31 GX5/16"
NEEDLE, DISPOSABLE MISCELLANEOUS
Qty: 100 EACH | Refills: 5 | Status: SHIPPED | OUTPATIENT
Start: 2018-12-03 | End: 2018-12-26 | Stop reason: CLARIF

## 2018-12-19 ENCOUNTER — TELEPHONE (OUTPATIENT)
Dept: ENDOCRINOLOGY | Age: 48
End: 2018-12-19

## 2019-01-08 ENCOUNTER — TELEPHONE (OUTPATIENT)
Dept: ENDOCRINOLOGY | Age: 49
End: 2019-01-08

## 2019-01-11 DIAGNOSIS — E55.9 VITAMIN D DEFICIENCY: ICD-10-CM

## 2019-01-11 RX ORDER — ERGOCALCIFEROL 1.25 MG/1
CAPSULE ORAL
Qty: 4 CAPSULE | Refills: 0 | Status: SHIPPED | OUTPATIENT
Start: 2019-01-11 | End: 2019-02-08 | Stop reason: SDUPTHER

## 2019-01-15 RX ORDER — FLASH GLUCOSE SENSOR
1 KIT MISCELLANEOUS DAILY
Qty: 2 EACH | Refills: 3 | Status: SHIPPED | OUTPATIENT
Start: 2019-01-15 | End: 2019-06-07 | Stop reason: ALTCHOICE

## 2019-01-15 RX ORDER — FLASH GLUCOSE SCANNING READER
1 EACH MISCELLANEOUS DAILY
Qty: 1 DEVICE | Refills: 0 | Status: SHIPPED | OUTPATIENT
Start: 2019-01-15 | End: 2019-06-07 | Stop reason: ALTCHOICE

## 2019-01-25 RX ORDER — INSULIN LISPRO 100 U/ML
INJECTION, SOLUTION SUBCUTANEOUS
Qty: 30 ML | Refills: 3 | Status: SHIPPED | OUTPATIENT
Start: 2019-01-25 | End: 2019-07-15 | Stop reason: SDUPTHER

## 2019-02-08 DIAGNOSIS — E55.9 VITAMIN D DEFICIENCY: ICD-10-CM

## 2019-02-08 RX ORDER — ERGOCALCIFEROL 1.25 MG/1
CAPSULE ORAL
Qty: 4 CAPSULE | Refills: 0 | Status: SHIPPED | OUTPATIENT
Start: 2019-02-08 | End: 2019-03-08 | Stop reason: SDUPTHER

## 2019-02-22 ENCOUNTER — OFFICE VISIT (OUTPATIENT)
Dept: ENDOCRINOLOGY | Age: 49
End: 2019-02-22
Payer: COMMERCIAL

## 2019-02-22 VITALS
BODY MASS INDEX: 39.46 KG/M2 | HEIGHT: 67 IN | WEIGHT: 251.4 LBS | SYSTOLIC BLOOD PRESSURE: 128 MMHG | DIASTOLIC BLOOD PRESSURE: 88 MMHG

## 2019-02-22 DIAGNOSIS — E11.9 DIABETES MELLITUS WITHOUT COMPLICATION (HCC): ICD-10-CM

## 2019-02-22 DIAGNOSIS — E11.9 DIABETES MELLITUS WITHOUT COMPLICATION (HCC): Primary | ICD-10-CM

## 2019-02-22 DIAGNOSIS — E78.2 MIXED HYPERLIPIDEMIA: ICD-10-CM

## 2019-02-22 DIAGNOSIS — I10 ESSENTIAL HYPERTENSION: ICD-10-CM

## 2019-02-22 LAB — HBA1C MFR BLD: 10.4 %

## 2019-02-22 PROCEDURE — G8417 CALC BMI ABV UP PARAM F/U: HCPCS | Performed by: INTERNAL MEDICINE

## 2019-02-22 PROCEDURE — 99214 OFFICE O/P EST MOD 30 MIN: CPT | Performed by: INTERNAL MEDICINE

## 2019-02-22 PROCEDURE — 97802 MEDICAL NUTRITION INDIV IN: CPT | Performed by: DIETITIAN, REGISTERED

## 2019-02-22 PROCEDURE — 4004F PT TOBACCO SCREEN RCVD TLK: CPT | Performed by: INTERNAL MEDICINE

## 2019-02-22 PROCEDURE — 2022F DILAT RTA XM EVC RTNOPTHY: CPT | Performed by: INTERNAL MEDICINE

## 2019-02-22 PROCEDURE — G8484 FLU IMMUNIZE NO ADMIN: HCPCS | Performed by: INTERNAL MEDICINE

## 2019-02-22 PROCEDURE — G8427 DOCREV CUR MEDS BY ELIG CLIN: HCPCS | Performed by: INTERNAL MEDICINE

## 2019-02-22 PROCEDURE — 3046F HEMOGLOBIN A1C LEVEL >9.0%: CPT | Performed by: INTERNAL MEDICINE

## 2019-02-22 PROCEDURE — 83036 HEMOGLOBIN GLYCOSYLATED A1C: CPT | Performed by: INTERNAL MEDICINE

## 2019-02-28 DIAGNOSIS — E11.9 DIABETES MELLITUS WITHOUT COMPLICATION (HCC): Primary | ICD-10-CM

## 2019-02-28 RX ORDER — INSULIN GLARGINE 100 [IU]/ML
INJECTION, SOLUTION SUBCUTANEOUS
Qty: 30 ML | Refills: 2 | Status: SHIPPED | OUTPATIENT
Start: 2019-02-28 | End: 2019-08-24 | Stop reason: SDUPTHER

## 2019-03-08 DIAGNOSIS — E55.9 VITAMIN D DEFICIENCY: ICD-10-CM

## 2019-03-08 RX ORDER — ERGOCALCIFEROL 1.25 MG/1
CAPSULE ORAL
Qty: 4 CAPSULE | Refills: 0 | Status: SHIPPED | OUTPATIENT
Start: 2019-03-08 | End: 2019-04-06 | Stop reason: SDUPTHER

## 2019-04-06 DIAGNOSIS — E55.9 VITAMIN D DEFICIENCY: ICD-10-CM

## 2019-04-08 RX ORDER — ERGOCALCIFEROL 1.25 MG/1
CAPSULE ORAL
Qty: 4 CAPSULE | Refills: 3 | Status: SHIPPED | OUTPATIENT
Start: 2019-04-08 | End: 2019-07-28 | Stop reason: SDUPTHER

## 2019-04-17 NOTE — TELEPHONE ENCOUNTER
Rx refill  Pt's LOV was 2/22/19 and has a F/U scheduled on  6/7/19, would you like to refill please?   Thanks, AD.

## 2019-05-07 RX ORDER — ROSUVASTATIN CALCIUM 5 MG/1
TABLET, COATED ORAL
Qty: 15 TABLET | Refills: 9 | Status: SHIPPED | OUTPATIENT
Start: 2019-05-07 | End: 2022-10-04

## 2019-05-13 ENCOUNTER — HOSPITAL ENCOUNTER (EMERGENCY)
Age: 49
Discharge: HOME | End: 2019-05-14
Attending: EMERGENCY MEDICINE
Payer: COMMERCIAL

## 2019-05-13 DIAGNOSIS — D17.79 MYELOLIPOMA OF RIGHT ADRENAL GLAND: ICD-10-CM

## 2019-05-13 DIAGNOSIS — R11.0 NAUSEA: ICD-10-CM

## 2019-05-13 DIAGNOSIS — R03.0 ELEVATED BLOOD PRESSURE READING: ICD-10-CM

## 2019-05-13 DIAGNOSIS — R16.0 ENLARGED LIVER: ICD-10-CM

## 2019-05-13 DIAGNOSIS — R10.9 RIGHT FLANK PAIN: Primary | ICD-10-CM

## 2019-05-13 LAB
A/G RATIO: 1.1 (ref 1.1–2.2)
ALBUMIN SERPL-MCNC: 3.6 G/DL (ref 3.4–5)
ALP BLD-CCNC: 125 U/L (ref 40–129)
ALT SERPL-CCNC: 20 U/L (ref 10–40)
ANION GAP SERPL CALCULATED.3IONS-SCNC: 10 MMOL/L (ref 3–16)
AST SERPL-CCNC: 15 U/L (ref 15–37)
BASOPHILS ABSOLUTE: 0.1 K/UL (ref 0–0.2)
BASOPHILS RELATIVE PERCENT: 0.6 %
BILIRUB SERPL-MCNC: 0.3 MG/DL (ref 0–1)
BILIRUBIN URINE: NEGATIVE
BLOOD, URINE: NEGATIVE
BUN BLDV-MCNC: 14 MG/DL (ref 7–20)
CALCIUM SERPL-MCNC: 9.5 MG/DL (ref 8.3–10.6)
CHLORIDE BLD-SCNC: 105 MMOL/L (ref 99–110)
CLARITY: ABNORMAL
CO2: 27 MMOL/L (ref 21–32)
COLOR: YELLOW
CREAT SERPL-MCNC: <0.5 MG/DL (ref 0.6–1.1)
EOSINOPHILS ABSOLUTE: 0.3 K/UL (ref 0–0.6)
EOSINOPHILS RELATIVE PERCENT: 2.8 %
GFR AFRICAN AMERICAN: >60
GFR NON-AFRICAN AMERICAN: >60
GLOBULIN: 3.2 G/DL
GLUCOSE BLD-MCNC: 236 MG/DL (ref 70–99)
GLUCOSE URINE: 500 MG/DL
HCG QUALITATIVE: NEGATIVE
HCT VFR BLD CALC: 40.4 % (ref 36–48)
HEMOGLOBIN: 13.7 G/DL (ref 12–16)
KETONES, URINE: ABNORMAL MG/DL
LEUKOCYTE ESTERASE, URINE: NEGATIVE
LYMPHOCYTES ABSOLUTE: 4.7 K/UL (ref 1–5.1)
LYMPHOCYTES RELATIVE PERCENT: 39.4 %
MCH RBC QN AUTO: 28.9 PG (ref 26–34)
MCHC RBC AUTO-ENTMCNC: 33.8 G/DL (ref 31–36)
MCV RBC AUTO: 85.5 FL (ref 80–100)
MICROSCOPIC EXAMINATION: ABNORMAL
MONOCYTES ABSOLUTE: 0.6 K/UL (ref 0–1.3)
MONOCYTES RELATIVE PERCENT: 4.8 %
NEUTROPHILS ABSOLUTE: 6.3 K/UL (ref 1.7–7.7)
NEUTROPHILS RELATIVE PERCENT: 52.4 %
NITRITE, URINE: NEGATIVE
PDW BLD-RTO: 13.5 % (ref 12.4–15.4)
PH UA: 6 (ref 5–8)
PLATELET # BLD: 221 K/UL (ref 135–450)
PMV BLD AUTO: 9 FL (ref 5–10.5)
POTASSIUM SERPL-SCNC: 4.2 MMOL/L (ref 3.5–5.1)
PROTEIN UA: NEGATIVE MG/DL
RBC # BLD: 4.73 M/UL (ref 4–5.2)
SODIUM BLD-SCNC: 142 MMOL/L (ref 136–145)
SPECIFIC GRAVITY UA: 1.02 (ref 1–1.03)
SPECIMEN STATUS: NORMAL
TOTAL PROTEIN: 6.8 G/DL (ref 6.4–8.2)
URINE REFLEX TO CULTURE: ABNORMAL
URINE TYPE: ABNORMAL
UROBILINOGEN, URINE: 0.2 E.U./DL
WBC # BLD: 12 K/UL (ref 4–11)

## 2019-05-13 PROCEDURE — 96374 THER/PROPH/DIAG INJ IV PUSH: CPT

## 2019-05-13 PROCEDURE — 99284 EMERGENCY DEPT VISIT MOD MDM: CPT

## 2019-05-13 PROCEDURE — 84703 CHORIONIC GONADOTROPIN ASSAY: CPT

## 2019-05-13 PROCEDURE — 6360000002 HC RX W HCPCS: Performed by: NURSE PRACTITIONER

## 2019-05-13 PROCEDURE — 85025 COMPLETE CBC W/AUTO DIFF WBC: CPT

## 2019-05-13 PROCEDURE — 81003 URINALYSIS AUTO W/O SCOPE: CPT

## 2019-05-13 PROCEDURE — 96375 TX/PRO/DX INJ NEW DRUG ADDON: CPT

## 2019-05-13 PROCEDURE — 80053 COMPREHEN METABOLIC PANEL: CPT

## 2019-05-13 PROCEDURE — 2580000003 HC RX 258: Performed by: NURSE PRACTITIONER

## 2019-05-13 PROCEDURE — 96361 HYDRATE IV INFUSION ADD-ON: CPT

## 2019-05-13 RX ORDER — ONDANSETRON 2 MG/ML
4 INJECTION INTRAMUSCULAR; INTRAVENOUS ONCE
Status: COMPLETED | OUTPATIENT
Start: 2019-05-13 | End: 2019-05-13

## 2019-05-13 RX ORDER — 0.9 % SODIUM CHLORIDE 0.9 %
1000 INTRAVENOUS SOLUTION INTRAVENOUS ONCE
Status: COMPLETED | OUTPATIENT
Start: 2019-05-13 | End: 2019-05-14

## 2019-05-13 RX ORDER — KETOROLAC TROMETHAMINE 30 MG/ML
30 INJECTION, SOLUTION INTRAMUSCULAR; INTRAVENOUS ONCE
Status: COMPLETED | OUTPATIENT
Start: 2019-05-13 | End: 2019-05-13

## 2019-05-13 RX ADMIN — ONDANSETRON 4 MG: 2 INJECTION INTRAMUSCULAR; INTRAVENOUS at 23:23

## 2019-05-13 RX ADMIN — KETOROLAC TROMETHAMINE 30 MG: 30 INJECTION, SOLUTION INTRAMUSCULAR at 23:23

## 2019-05-13 RX ADMIN — SODIUM CHLORIDE 1000 ML: 9 INJECTION, SOLUTION INTRAVENOUS at 23:23

## 2019-05-13 ASSESSMENT — PAIN SCALES - GENERAL
PAINLEVEL_OUTOF10: 9
PAINLEVEL_OUTOF10: 9

## 2019-05-13 ASSESSMENT — PAIN DESCRIPTION - LOCATION: LOCATION: FLANK

## 2019-05-13 ASSESSMENT — PAIN DESCRIPTION - ORIENTATION: ORIENTATION: RIGHT

## 2019-05-14 ENCOUNTER — APPOINTMENT (OUTPATIENT)
Dept: CT IMAGING | Age: 49
End: 2019-05-14
Payer: COMMERCIAL

## 2019-05-14 VITALS
SYSTOLIC BLOOD PRESSURE: 117 MMHG | OXYGEN SATURATION: 99 % | BODY MASS INDEX: 39.24 KG/M2 | RESPIRATION RATE: 16 BRPM | TEMPERATURE: 98.2 F | WEIGHT: 250 LBS | HEIGHT: 67 IN | DIASTOLIC BLOOD PRESSURE: 64 MMHG | HEART RATE: 87 BPM

## 2019-05-14 PROCEDURE — 74176 CT ABD & PELVIS W/O CONTRAST: CPT

## 2019-05-14 PROCEDURE — 96361 HYDRATE IV INFUSION ADD-ON: CPT

## 2019-05-14 RX ORDER — ONDANSETRON 4 MG/1
4 TABLET, ORALLY DISINTEGRATING ORAL EVERY 8 HOURS PRN
Qty: 10 TABLET | Refills: 0 | Status: SHIPPED | OUTPATIENT
Start: 2019-05-14 | End: 2019-09-09 | Stop reason: SDUPTHER

## 2019-05-14 RX ORDER — IBUPROFEN 600 MG/1
600 TABLET ORAL EVERY 8 HOURS PRN
Qty: 30 TABLET | Refills: 0 | Status: SHIPPED | OUTPATIENT
Start: 2019-05-14 | End: 2020-11-25

## 2019-05-14 RX ORDER — ONDANSETRON 4 MG/1
4 TABLET, ORALLY DISINTEGRATING ORAL ONCE
Status: DISCONTINUED | OUTPATIENT
Start: 2019-05-14 | End: 2019-05-14 | Stop reason: HOSPADM

## 2019-05-14 ASSESSMENT — ENCOUNTER SYMPTOMS
SHORTNESS OF BREATH: 0
SORE THROAT: 0
WHEEZING: 0
BACK PAIN: 0
COLOR CHANGE: 0
ABDOMINAL PAIN: 1
VOMITING: 0
DIARRHEA: 0
COUGH: 0
NAUSEA: 0

## 2019-05-14 ASSESSMENT — PAIN SCALES - GENERAL: PAINLEVEL_OUTOF10: 6

## 2019-05-14 NOTE — ED PROVIDER NOTES
I independently performed a history and physical on kwiry. This is a very pleasant 54 y.o. female  who was evaluated in the emergency department for flank pain. Focused exam:  The physical exam reveals an alert and oriented patient who does not appear to be confused, non-ill-appearing, no abnormal heart or lung sounds, no CVA tenderness, benign abdominal exam    Brief ED course/MDM:     Procedures/interventions/images ordered for this visit  Orders Placed This Encounter   Procedures    CT ABDOMEN PELVIS WO CONTRAST Additional Contrast? None    Urinalysis Reflex to Culture    CBC Auto Differential    Comprehensive Metabolic Panel    HCG Qualitative, Serum    Sample possible blood bank testing    Saline lock IV       Medications ordered for this visit  Orders Placed This Encounter   Medications    0.9 % sodium chloride bolus    ketorolac (TORADOL) injection 30 mg    ondansetron (ZOFRAN) injection 4 mg    ibuprofen (ADVIL;MOTRIN) 600 MG tablet     Sig: Take 1 tablet by mouth every 8 hours as needed for Pain     Dispense:  30 tablet     Refill:  0    ondansetron (ZOFRAN ODT) 4 MG disintegrating tablet     Sig: Take 1 tablet by mouth every 8 hours as needed for Nausea or Vomiting     Dispense:  10 tablet     Refill:  0    DISCONTD: ondansetron (ZOFRAN-ODT) disintegrating tablet 4 mg     Is aware of the abnormal findings on the CT. This is a very pleasant patient with abdominal pain without significant evidence of toxicity, shock, sepsis, hemodynamic or cardiopulmonary instability, acute appendicitis, acute cholecystitis, AAA, bowel obstruction, bowel perforation, herpes zoster, a cardiac or pulmonary etiology as a cause for the abdominal symptoms, or any disease process requiring other immediate surgical or medical intervention at this time. After treatment in the ER, patient had improvement of their symptoms.   On repeat physical exam, patient has a benign abdominal exam.  Pain management and follow-up plan were discussed with the patient. It is understood that if the patient is not improving as expected or if other new symptoms or signs of concern develop, other etiologies or diagnoses may need to be considered requiring other tests, treatments, consultations, and/or admission. The diagnosis, plan, expected course, follow-up, and return precautions were discussed and all questions were answered. Final Impression    1. Right flank pain    2. Enlarged liver    3. Myelolipoma of right adrenal gland    4. Elevated blood pressure reading    5. Nausea        Blood pressure 117/64, pulse 87, temperature 98.2 °F (36.8 °C), temperature source Oral, resp. rate 16, height 5' 7\" (1.702 m), weight 250 lb (113.4 kg), SpO2 99 %, not currently breastfeeding. All diagnostic, treatment, and disposition decisions were made by myself in conjunction with the ADY/resident. For further details of the patient's emergency department visit, please see ADY/resident documentation. Initial history and physical exam information was obtained by the ADY/resident, also dictated a record of this visit. I independently examined and evaluated this patient and participated in the diagnostic, treatment and disposition decisions. The note was completed using Dragon voice recognition transcription. Every effort was made to ensure accuracy; however, inadvertent transcription errors may be present despite my best efforts to edit errors.     Carlos Sweet MD  [address] [place] [address]        Carlos Sweet MD  05/14/19 0600

## 2019-05-14 NOTE — ED PROVIDER NOTES
[address] [facility]  ED  EMERGENCY DEPARTMENT ENCOUNTER      Pt Name: Dung Ley  PUF:[id number]  Caryntrongfrocky 1970  Date of evaluation: 5/13/2019  Provider: ROGELIO Cyr CNP      Chief Complaint:    Chief Complaint   Patient presents with    Flank Pain     hurts when she moves and breaths, started Friday and stopped through the weekend and started again today. Urine test was done on friday and there was high glucose and ketones. Nursing Notes, Past Medical Hx, Past Surgical Hx, Social Hx, Allergies, and Family Hx were all reviewed and agreed with or any disagreements were addressed in the HPI.    HPI:  (Location, Duration, Timing, Severity,Quality, Assoc Sx, Context, Modifying factors)  This is a  54 y.o. female who presents today with right sided flank pain that began Friday afternoon, got better after going to PCP as her urine was normal besides of glucose and ketones. She states that her symptoms then returned and worsened this morning. She states her pain is worse with movement and breathing, denies any falls or traumas or injuries. She states that she has no urinary symptoms, vaginal bleeding or discharge. She denies any cough, congestion, fever or chills. She rates her pain a 10, states is constant and sharp. She denies taking any additional medicine besides anti-inflammatories without improvement. She denies any additional complaints, no additional aggravating or relieving factors. The patient presents awake, alert and in no acute respiratory distress or toxic appearance.     PastMedical/Surgical History:      Diagnosis Date    Anxiety     Hypertension     Ovarian cyst     Type II or unspecified type diabetes mellitus without mention of complication, not stated as uncontrolled          Procedure Laterality Date    ABDOMEN SURGERY      bladder mesh on March 5 2019    APPENDECTOMY      CHOLECYSTECTOMY      DILATION AND CURETTAGE OF UTERUS      ENDOMETRIAL ABLATION      MIDDLE EAR SURGERY  10/2002    left ossicular reconstrucion    TUBAL LIGATION         Medications:  Discharge Medication List as of 5/14/2019  2:13 AM      CONTINUE these medications which have NOT CHANGED    Details   rosuvastatin (CRESTOR) 5 MG tablet TAKE ONE TABLET BY MOUTH EVERY OTHER DAY, Disp-15 tablet, R-9Normal      blood glucose test strips (FREESTYLE LITE) strip Disp-50 strip, R-5, NormalUSE TO TEST BLOOD GLUCOSE LEVELS THREE TIMES A DAY      vitamin D (ERGOCALCIFEROL) 85470 units CAPS capsule TAKE ONE CAPSULE BY MOUTH ONCE WEEKLY, Disp-4 capsule, R-3Normal      BASAGLAR KWIKPEN 100 UNIT/ML injection pen INJECT 55 UNITS UNDER THE SKIN ONCE NIGHTLY, Disp-30 mL, R-2Normal      ADMELOG SOLOSTAR 100 UNIT/ML pen INJECT UNDER THE SKIN 18 TO 26 UNITS THREE TIMES A DAY BEFORE MEALS, Disp-30 mL, R-3, DAWNormal      Continuous Blood Gluc  (FREESTYLE AMPARO 14 DAY READER) MIRZA 1 each by Does not apply route daily, Disp-1 Device, R-0Normal      Continuous Blood Gluc Sensor (FREESTYLE AMPARO 14 DAY SENSOR) MISC 1 each by Does not apply route daily, Disp-2 each, R-3Normal      Insulin Pen Needle ([employer]ER PEN NEEDLES 31G) 31G X 8 MM MISC 4 TIMES DAILY Starting Wed 12/26/2018, Disp-130 each, R-10, NormalDISPENSE ANY INSURANCE COVERED BRAND       Blood Glucose Monitoring Suppl (FREESTYLE LITE) MIRZA Disp-1 Device, R-0, NormalTest BS 3 times a day. On insulin Dx Code E11.9      FREESTYLE LANCETS MISC Disp-100 each, R-3, NormalTest BS 3 times a day.  On insulin Dx Code E11.9      sertraline (ZOLOFT) 100 MG tablet Take 100 mg by mouth 2 times daily Historical Med      hydrochlorothiazide (HYDRODIURIL) 50 MG tablet Take 50 mg by mouth daily      fluocinonide (LIDEX) 0.05 % ointment Apply sparingly to affected area(s) up to bid prn, Disp-60 g, R-0, Normal      lisinopril (PRINIVIL;ZESTRIL) 40 MG tablet Take 1 tablet by mouth daily, Disp-30 tablet, R-3               Review of Systems:  Review of Systems Constitutional: Negative for chills and fever. HENT: Negative for congestion and sore throat. Respiratory: Negative for cough, shortness of breath and wheezing. Cardiovascular: Negative for chest pain. Gastrointestinal: Positive for abdominal pain. Negative for diarrhea, nausea and vomiting. Patient complains of bilateral flank pain with right side being worse than the left, states her pain is now radiating around to her abdomen. She states she had a urine test done at her doctor's office on Friday and it showed glucose and ketones. Genitourinary: Positive for flank pain. Negative for difficulty urinating, dysuria, frequency, hematuria, urgency, vaginal bleeding and vaginal discharge. She denies any vaginal bleeding or discharge, is complaining of right flank pain. No concern for pregnancy or STD. Musculoskeletal: Negative for back pain. Skin: Negative for color change. Neurological: Negative for weakness, numbness and headaches. Positives and Pertinent negatives as per HPI. Except as noted above in the ROS, problem specific ROS was completed and is negative. Physical Exam:  Physical Exam   Constitutional: She is oriented to person, place, and time. She appears well-developed and well-nourished. HENT:   Head: Normocephalic. Right Ear: External ear normal.   Left Ear: External ear normal.   Mouth/Throat: Oropharynx is clear and moist.   Eyes: Right eye exhibits no discharge. Left eye exhibits no discharge. No scleral icterus. Neck: Normal range of motion. Neck supple. Cardiovascular: Normal rate and normal heart sounds. Pulmonary/Chest: Effort normal and breath sounds normal. No respiratory distress. Abdominal: Soft. There is tenderness. Abdomen is soft and nondistended. Bowel sounds are positive, patient has right-sided CVA tenderness, no left-sided CVA tenderness. No acute ascites or rigidity.   No rebound tenderness   Musculoskeletal: Normal range of motion. Neurological: She is alert and oriented to person, place, and time. GCS eye subscore is 4. GCS verbal subscore is 5. GCS motor subscore is 6. Skin: Skin is warm. Capillary refill takes less than 2 seconds. She is not diaphoretic. No pallor. Psychiatric: She has a normal mood and affect. Her behavior is normal.   Nursing note and vitals reviewed.       MEDICAL DECISION MAKING    Vitals:    Vitals:    05/13/19 2047 05/14/19 0224   BP: (!) 154/95 117/64   Pulse: 89 87   Resp: 18 16   Temp: 98.2 °F (36.8 °C)    TempSrc: Oral    SpO2: 98% 99%   Weight: 250 lb (113.4 kg)    Height: 5' 7\" (1.702 m)        LABS:  Labs Reviewed   URINE RT REFLEX TO CULTURE - Abnormal; Notable for the following components:       Result Value    Clarity, UA SL CLOUDY (*)     Glucose, Ur 500 (*)     Ketones, Urine TRACE (*)     All other components within normal limits    Narrative:     Performed at:  [place] [address] [address]   Phone [phone]   CBC WITH AUTO DIFFERENTIAL - Abnormal; Notable for the following components:    WBC 12.0 (*)     All other components within normal limits    Narrative:     Performed at:  [place] [address] [address]   Phone [phone]   COMPREHENSIVE METABOLIC PANEL - Abnormal; Notable for the following components:    Glucose 236 (*)     CREATININE <0.5 (*)     All other components within normal limits    Narrative:     Performed at:  [place] [address] [address]   Phone [phone]   HCG, SERUM, QUALITATIVE    Narrative:     Performed at:  [place] [address] [address]   Phone [phone] POSSIBLE BLOOD BANK TESTING    Narrative:     Performed at:  [place] [address] [address]   Phone [phone] The plan is for the patient to be discharged home as long as there is no acute findings on the CT. The patient tolerated their visit well. I evaluated the patient. The physician was available for consultation as needed. The patient and / or the family were informed of the results of anytests, a time was given to answer questions, a plan was proposed and they agreed with plan. CLINICAL IMPRESSION:  1. Right flank pain    2. Enlarged liver    3. Myelolipoma of right adrenal gland    4. Elevated blood pressure reading    5.  Nausea        DISPOSITION        PATIENT REFERRED TO:  Angel Luna, RD, LD  [address] [address]    Schedule an appointment as soon as possible for a visit       [facility]  ED  [facility] [address]  [phone]    As needed, If symptoms worsen      DISCHARGE MEDICATIONS:  Discharge Medication List as of 5/14/2019  2:13 AM      START taking these medications    Details   ibuprofen (ADVIL;MOTRIN) 600 MG tablet Take 1 tablet by mouth every 8 hours as needed for Pain, Disp-30 tablet, R-0Print      ondansetron (ZOFRAN ODT) 4 MG disintegrating tablet Take 1 tablet by mouth every 8 hours as needed for Nausea or Vomiting, Disp-10 tablet, R-0Print             DISCONTINUED MEDICATIONS:  Discharge Medication List as of 5/14/2019  2:13 AM                 (Please note the MDM and HPI sections of this note were completed with a voice recognition program.  Efforts weremade to edit the dictations but occasionally words are mis-transcribed.)    Electronically signed, ROGELIO Monte CNP,         ROGELIO Monte CNP  05/14/19 0515

## 2019-06-07 ENCOUNTER — OFFICE VISIT (OUTPATIENT)
Dept: ENDOCRINOLOGY | Age: 49
End: 2019-06-07
Payer: COMMERCIAL

## 2019-06-07 VITALS
SYSTOLIC BLOOD PRESSURE: 123 MMHG | BODY MASS INDEX: 39.49 KG/M2 | HEIGHT: 67 IN | OXYGEN SATURATION: 97 % | DIASTOLIC BLOOD PRESSURE: 74 MMHG | HEART RATE: 85 BPM | WEIGHT: 251.6 LBS

## 2019-06-07 DIAGNOSIS — E11.9 DIABETES MELLITUS WITHOUT COMPLICATION (HCC): Primary | ICD-10-CM

## 2019-06-07 DIAGNOSIS — I10 ESSENTIAL HYPERTENSION: ICD-10-CM

## 2019-06-07 DIAGNOSIS — E78.2 MIXED HYPERLIPIDEMIA: ICD-10-CM

## 2019-06-07 PROCEDURE — G8427 DOCREV CUR MEDS BY ELIG CLIN: HCPCS | Performed by: INTERNAL MEDICINE

## 2019-06-07 PROCEDURE — 99214 OFFICE O/P EST MOD 30 MIN: CPT | Performed by: INTERNAL MEDICINE

## 2019-06-07 PROCEDURE — G8417 CALC BMI ABV UP PARAM F/U: HCPCS | Performed by: INTERNAL MEDICINE

## 2019-06-07 PROCEDURE — 3046F HEMOGLOBIN A1C LEVEL >9.0%: CPT | Performed by: INTERNAL MEDICINE

## 2019-06-07 PROCEDURE — 2022F DILAT RTA XM EVC RTNOPTHY: CPT | Performed by: INTERNAL MEDICINE

## 2019-06-07 PROCEDURE — 4004F PT TOBACCO SCREEN RCVD TLK: CPT | Performed by: INTERNAL MEDICINE

## 2019-06-07 PROCEDURE — 97802 MEDICAL NUTRITION INDIV IN: CPT | Performed by: DIETITIAN, REGISTERED

## 2019-06-07 NOTE — PROGRESS NOTES
Device 0    FREESTYLE LANCETS MISC Test BS 3 times a day. On insulin Dx Code E11.9 100 each 3    sertraline (ZOLOFT) 100 MG tablet Take 100 mg by mouth 2 times daily       hydrochlorothiazide (HYDRODIURIL) 50 MG tablet Take 50 mg by mouth daily      fluocinonide (LIDEX) 0.05 % ointment Apply sparingly to affected area(s) up to bid prn 60 g 0    lisinopril (PRINIVIL;ZESTRIL) 40 MG tablet Take 1 tablet by mouth daily 30 tablet 3         Vitals:    06/07/19 1006   BP: 123/74   Site: Right Upper Arm   Position: Sitting   Cuff Size: Large Adult   Pulse: 85   SpO2: 97%   Weight: 251 lb 9.6 oz (114.1 kg)   Height: 5' 7\" (1.702 m)     Body mass index is 39.41 kg/m². Wt Readings from Last 3 Encounters:   06/07/19 251 lb 9.6 oz (114.1 kg)   05/13/19 250 lb (113.4 kg)   02/22/19 251 lb 6.4 oz (114 kg)     BP Readings from Last 3 Encounters:   06/07/19 123/74   05/14/19 117/64   02/22/19 128/88        Past Medical History:   Diagnosis Date    Anxiety     Hypertension     Ovarian cyst     Type II or unspecified type diabetes mellitus without mention of complication, not stated as uncontrolled      Past Surgical History:   Procedure Laterality Date    ABDOMEN SURGERY      bladder mesh on March 5 2019    APPENDECTOMY      CHOLECYSTECTOMY      DILATION AND CURETTAGE OF UTERUS      ENDOMETRIAL ABLATION      MIDDLE EAR SURGERY  10/2002    left ossicular reconstrucion    TUBAL LIGATION       History reviewed. No pertinent family history. Social History     Tobacco Use   Smoking Status Current Every Day Smoker   Smokeless Tobacco Never Used   Tobacco Comment    3 cigarettes daily      Social History     Substance and Sexual Activity   Alcohol Use Yes    Comment: rare       HPI      Darren Rice is a 54 y.o. female who is here for a follow-up for management of DM. She was previously living in [facility]. Edmund Holm CNP    Patient has a PMH of Type 2 DM, hypertension, hyperlipidemia, obesity. Diagnosed with Diabetes Mellitus type 2 in 1645-5098. Course has been variable . Microvascular complications: No known retinopathy (Last eye exam:  2016), No nephropathy . No Peripheral neuropathy    Home regimen:    basaglar 80 units at night  Admeolg 12-18  Units + SSI with dinner                             Previous medications: Metformin /jannmet ( diarrhea). Byetta ( abdominal pain). Amaryl 8 mg daily. Tolerated Bydureon without side effects. Victoza ( GI side effects)  Novolog    Checks BS 3-4 times a day    Fasting     Lunch  200-300    Evening 200-300    Diet: Eats 2-3  Meal/day. Had Nutrition education   Not drinking soda  Exercise: getting PT for back pain. Hyperlipidemia: She has mixed hyperlipidemia. She is currently on Crestor 5 mg daily. Was previously using  pravastatin 40 mg daily. Hypertension: She has HTN for many yrs  Currently on lisinopril 40 mg daily. She has lost some weight since last visit. Review of Systems   Constitutional: Positive for malaise/fatigue. Negative for fever, chills, weight loss and diaphoresis. Eyes: Negative for blurred vision, double vision and photophobia. Respiratory: Negative for cough and hemoptysis. Cardiovascular: Negative for chest pain, palpitations and orthopnea. Genitourinary: Negative for dysuria, urgency, frequency, hematuria and flank pain. Musculoskeletal: Positive for myalgias. Negative for back pain, joint pain, falls and neck pain. Skin: Negative for itching and rash. Neurological: Positive for headaches. Negative for dizziness, tingling, tremors, sensory change, speech change, focal weakness, seizures and loss of consciousness. Endo/Heme/Allergies: Negative for environmental allergies and polydipsia. Does not bruise/bleed easily. Psychiatric/Behavioral: Negative for depression, suicidal ideas, hallucinations, memory loss and substance abuse.  The patient is not nervous/anxious and does not have insomnia. Physical Exam   Constitutional: She is oriented to person, place, and time. She appears well-developed and well-nourished. Psychiatric: Her behavior is normal.             Orders Only on 06/04/2019   Component Date Value Ref Range Status    TSH 06/04/2019 2.800  0.270 - 4.200 mIU/L Final    Comment: (NOTE)  Ingestion of stephany doses of biotin (>5 mg/day) taken within 8 hours of  drawing blood sample can interfere with this immunoassay test.      Creatinine, Ur 06/04/2019 210.7  mg/dL Final    Tested at [address]   Orders Only on 06/04/2019   Component Date Value Ref Range Status    Cholesterol, Total 06/04/2019 133  <=200 mg/dL Final    Comment: (NOTE)  < 200            Desirable  200 - 239     Borderline High  >= 240          High      Triglycerides 06/04/2019 110  <=150 mg/dL Final    Comment: (NOTE)  < 150         Normal  150 - 199    Borderline High  200 - 499    High   >= 500       Very High      HDL 06/04/2019 35* >=40 mg/dL Final    Comment: (NOTE)   > 60      Optimal  40 - 60    Acceptable    < 40      Low      LDL Calculated 06/04/2019 76  <=100 mg/dL Final    Cholesterol 06/04/2019 98  <=129 mg/dL Final    Comment: (NOTE)  <130    Desirable  130-159 Above Desirable  160-189 Borderline High  190-219 High  >= 220  Very High     Orders Only on 06/04/2019   Component Date Value Ref Range Status    Microalbumin, Random Urine 06/04/2019 <12.0  mg/L Final    Creatinine, Ur 06/04/2019 208.1  mg/dL Final    Microalbumin Creatinine Ratio 06/04/2019 See Comment  0 - 30 mg/g Final    Comment: (NOTE)  Because the albumin level is below the level of detection in this  urine specimen, the laboratory is unable to calculate a reliable  albumin/creatinine ratio. Microalbuminuria is unlikely if the urine albumin concentration is  less than 20-30 mg/L in a random specimen.      Orders Only on 06/04/2019   Component Date Value Ref Range Status    Hemoglobin 4.8  % Final    Eosinophils % 05/13/2019 2.8  % Final    Basophils % 05/13/2019 0.6  % Final    Neutrophils # 05/13/2019 6.3  1.7 - 7.7 K/uL Final    Lymphocytes # 05/13/2019 4.7  1.0 - 5.1 K/uL Final    Monocytes # 05/13/2019 0.6  0.0 - 1.3 K/uL Final    Eosinophils # 05/13/2019 0.3  0.0 - 0.6 K/uL Final    Basophils # 05/13/2019 0.1  0.0 - 0.2 K/uL Final    Sodium 05/13/2019 142  136 - 145 mmol/L Final    Potassium 05/13/2019 4.2  3.5 - 5.1 mmol/L Final    Chloride 05/13/2019 105  99 - 110 mmol/L Final    CO2 05/13/2019 27  21 - 32 mmol/L Final    Anion Gap 05/13/2019 10  3 - 16 Final    Glucose 05/13/2019 236* 70 - 99 mg/dL Final    BUN 05/13/2019 14  7 - 20 mg/dL Final    CREATININE 05/13/2019 <0.5* 0.6 - 1.1 mg/dL Final    GFR Non- 05/13/2019 >60  >60 Final    Comment: >60 mL/min/1.73m2 EGFR, calc. for ages 25 and older using the  MDRD formula (not corrected for weight), is valid for stable  renal function.  GFR  05/13/2019 >60  >60 Final    Comment: Chronic Kidney Disease: less than 60 ml/min/1.73 sq.m. Kidney Failure: less than 15 ml/min/1.73 sq.m. Results valid for patients 18 years and older.  Calcium 05/13/2019 9.5  8.3 - 10.6 mg/dL Final    Total Protein 05/13/2019 6.8  6.4 - 8.2 g/dL Final    Alb 05/13/2019 3.6  3.4 - 5.0 g/dL Final    Albumin/Globulin Ratio 05/13/2019 1.1  1.1 - 2.2 Final    Total Bilirubin 05/13/2019 0.3  0.0 - 1.0 mg/dL Final    Alkaline Phosphatase 05/13/2019 125  40 - 129 U/L Final    ALT 05/13/2019 20  10 - 40 U/L Final    AST 05/13/2019 15  15 - 37 U/L Final    Globulin 05/13/2019 3.2  g/dL Final    hCG Qual 05/13/2019 Negative  Detects HCG level >10 MIU/mL Final    Specimen Status 05/13/2019 KATARINA   Final        Assessment/Plan    1. Type 2 DM     [facility] is a 54 y.o. female has Type 2 DM with obesity and insulin resistance. Poorly controlled.      A1c 9.7 % --->9.2 % ---> 8.4 % --->7.4 % ---> 7.8 %---> 8.5 %-->9.8 %---> 8 %---> 10.9 % ---> 11.1 % --> 11 % ---> 10.4 % ---> 10.2 %          Continues to be non-compliant with food. Has been educated multiple times, saw dietician. She understands the risks associated with uncontrolled DM. Fasting sugar low normal.   BS high after meals.        -Reduce Basaglar to 75 units QHS  -Use admelog 14 units with breakfast and lunch  Use 20 units before dinner + SSI  Gave her new chart. Following Alirio Fletcher for MNT. Advised follow-up with the ophthalmologist once year. Urine microalbumin/cr ratio was normal in 02/15, 12/15, 09/16, 05/18, 06/19     Discussed foot care. ASA 81 mg daily. Smoker. Discussed smoking cessation. 2. Hypertension. BP at goal today     3. Hyperlipidemia. On Crestor 5  mg daily. 07/18---> 06/19  HDL 39---> 35  LDL 59---> 76  ---> 110      Continue same regimen     4. Psoriasis. As per   Dr. Tabatha Baron. 5. Neuropathy. Used neurontin but had headaches. On Lyrica 50 mg BID       6. Obesity. Used adipex and had some weight loss.

## 2019-06-07 NOTE — PROGRESS NOTES
Medical Nutrition Therapy for Diabetes    Carmen Rees  June 7, 2019      Patient Care Team:  Pierce Anaya RD, LD as PCP - General (Dietitian)  Marlena Durbin MD as Consulting Physician (Otolaryngology)    Reason for visit: Uncontrolled Type 2 Diabetes    ASSESSMENT/PLAN:   NUTRITION DIAGNOSIS    #1 Problem: Altered Nutrition-Related Laboratory Values (NC-2.2)  Related to: Endocrine/Diabetes   As Evidenced by: Elevated Plasma glucose and/or HgbA1c levels         #2 Problem: Overweight/Obesity (NC-3.3)  Related to: Excessive energy intake or physical inactivity  As Evidenced by: BMI more than normative standard for age and sex (BMI=39.41 kg/m2)    #3 Problem: Excessive Oral Intake  Related to: Inability to limit or refuse offered food or Psychological causes such as depression  As evidenced by: Intake of large portions of foods/beverages, food groups, or specified food items; Frequent, excessive fast food or restaurant intake; or Binge eating patterns    NUTRITION INTERVENTION  Nutrition Prescription: 2 meals per day    Diabetes Education/Counseling included:  other: Strategies to begin additional eating times. Interventions:  Congratulated patient on improved frequency of taking insulin. Recommended choosing a time am or early afternoon to have 1st of the day.     NUTRITION MONITORING AND EVALUATION  Indicator/Goal   #1  Improved A1C    #2  Maintain current weight   #3  2 meals daily            Patient Active Problem List   Diagnosis    Diabetes mellitus without complication (HCC)    Hypertension    Hyperlipidemia    Morbid obesity ([address]    Plaque psoriasis    Vitamin D deficiency    Tobacco dependence       Current Outpatient Medications   Medication Sig Dispense Refill    aspirin 81 MG tablet Take 81 mg by mouth daily      ibuprofen (ADVIL;MOTRIN) 600 MG tablet Take 1 tablet by mouth every 8 hours as needed for Pain 30 tablet 0    ondansetron (ZOFRAN ODT) 4 MG disintegrating tablet Take 1 tablet by mouth every 8 hours as needed for Nausea or Vomiting 10 tablet 0    rosuvastatin (CRESTOR) 5 MG tablet TAKE ONE TABLET BY MOUTH EVERY OTHER DAY 15 tablet 9    blood glucose test strips (FREESTYLE LITE) strip USE TO TEST BLOOD GLUCOSE LEVELS THREE TIMES A DAY 50 strip 5    vitamin D (ERGOCALCIFEROL) 21621 units CAPS capsule TAKE ONE CAPSULE BY MOUTH ONCE WEEKLY 4 capsule 3    BASAGLAR KWIKPEN 100 UNIT/ML injection pen INJECT 55 UNITS UNDER THE SKIN ONCE NIGHTLY (Patient taking differently: INJECT 80 UNITS UNDER THE SKIN ONCE NIGHTLY) 30 mL 2    ADMELOG SOLOSTAR 100 UNIT/ML pen INJECT UNDER THE SKIN 18 TO 26 UNITS THREE TIMES A DAY BEFORE MEALS 30 mL 3    Insulin Pen Needle ([employer] PEN NEEDLES 31G) 31G X 8 MM MISC 1 each by Does not apply route 4 times daily DISPENSE ANY INSURANCE COVERED BRAND 130 each 10    Blood Glucose Monitoring Suppl (FREESTYLE LITE) MIRZA Test BS 3 times a day. On insulin Dx Code E11.9 1 Device 0    FREESTYLE LANCETS MISC Test BS 3 times a day. On insulin Dx Code E11.9 100 each 3    sertraline (ZOLOFT) 100 MG tablet Take 100 mg by mouth 2 times daily       hydrochlorothiazide (HYDRODIURIL) 50 MG tablet Take 50 mg by mouth daily      fluocinonide (LIDEX) 0.05 % ointment Apply sparingly to affected area(s) up to bid prn 60 g 0    lisinopril (PRINIVIL;ZESTRIL) 40 MG tablet Take 1 tablet by mouth daily 30 tablet 3     No current facility-administered medications for this visit.           NUTRITION ASSESSMENT    Biochemical Data:    Lab Results   Component Value Date    LABA1C 10.2 (H) 06/04/2019     Lab Results   Component Value Date     06/04/2019       Lab Results   Component Value Date    CHOL 133 06/04/2019    CHOL 98 06/04/2019    CHOL 126 07/09/2018     Lab Results   Component Value Date    TRIG 110 06/04/2019    TRIG 142 07/09/2018    TRIG 127 09/14/2016     Lab Results   Component Value Date    HDL 35 (L) 06/04/2019    HDL 39 (L) 07/09/2018    HDL 46 09/14/2016     Lab obstacle to change.     Follow Up Plan: 4 months    Referring Provider: Cierra Ruffin MD  Time spent with patient: 30 minutes

## 2019-07-03 DIAGNOSIS — E11.9 DIABETES MELLITUS WITHOUT COMPLICATION (HCC): Primary | ICD-10-CM

## 2019-07-16 RX ORDER — INSULIN LISPRO 100 U/ML
INJECTION, SOLUTION SUBCUTANEOUS
Qty: 30 ML | Refills: 3 | Status: SHIPPED | OUTPATIENT
Start: 2019-07-16 | End: 2019-12-23

## 2019-07-28 DIAGNOSIS — E55.9 VITAMIN D DEFICIENCY: ICD-10-CM

## 2019-07-29 RX ORDER — ERGOCALCIFEROL 1.25 MG/1
CAPSULE ORAL
Qty: 4 CAPSULE | Refills: 2 | Status: SHIPPED | OUTPATIENT
Start: 2019-07-29 | End: 2019-10-26 | Stop reason: SDUPTHER

## 2019-08-20 ENCOUNTER — HOSPITAL ENCOUNTER (EMERGENCY)
Age: 49
Discharge: HOME | End: 2019-08-20
Payer: COMMERCIAL

## 2019-08-20 ENCOUNTER — APPOINTMENT (OUTPATIENT)
Dept: GENERAL RADIOLOGY | Age: 49
End: 2019-08-20
Payer: COMMERCIAL

## 2019-08-20 VITALS
HEART RATE: 93 BPM | DIASTOLIC BLOOD PRESSURE: 86 MMHG | OXYGEN SATURATION: 96 % | WEIGHT: 248 LBS | HEIGHT: 67 IN | RESPIRATION RATE: 16 BRPM | TEMPERATURE: 98.4 F | BODY MASS INDEX: 38.92 KG/M2 | SYSTOLIC BLOOD PRESSURE: 146 MMHG

## 2019-08-20 DIAGNOSIS — M79.602 LEFT ARM PAIN: Primary | ICD-10-CM

## 2019-08-20 PROCEDURE — 99283 EMERGENCY DEPT VISIT LOW MDM: CPT

## 2019-08-20 PROCEDURE — 73070 X-RAY EXAM OF ELBOW: CPT

## 2019-08-20 ASSESSMENT — PAIN SCALES - GENERAL: PAINLEVEL_OUTOF10: 8

## 2019-08-24 DIAGNOSIS — E11.9 DIABETES MELLITUS WITHOUT COMPLICATION (HCC): ICD-10-CM

## 2019-09-08 ENCOUNTER — HOSPITAL ENCOUNTER (EMERGENCY)
Age: 49
Discharge: HOME | End: 2019-09-09
Payer: COMMERCIAL

## 2019-09-08 DIAGNOSIS — R51.9 NONINTRACTABLE HEADACHE, UNSPECIFIED CHRONICITY PATTERN, UNSPECIFIED HEADACHE TYPE: Primary | ICD-10-CM

## 2019-09-08 LAB
BILIRUBIN URINE: NEGATIVE
BLOOD, URINE: NEGATIVE
CLARITY: ABNORMAL
COLOR: YELLOW
GLUCOSE URINE: >=1000 MG/DL
HCG(URINE) PREGNANCY TEST: NEGATIVE
KETONES, URINE: NEGATIVE MG/DL
LEUKOCYTE ESTERASE, URINE: NEGATIVE
MICROSCOPIC EXAMINATION: ABNORMAL
NITRITE, URINE: NEGATIVE
PH UA: 7.5 (ref 5–8)
PROTEIN UA: NEGATIVE MG/DL
SPECIFIC GRAVITY UA: 1.01 (ref 1–1.03)
URINE TYPE: ABNORMAL
UROBILINOGEN, URINE: 0.2 E.U./DL

## 2019-09-08 PROCEDURE — 99283 EMERGENCY DEPT VISIT LOW MDM: CPT

## 2019-09-08 PROCEDURE — 81003 URINALYSIS AUTO W/O SCOPE: CPT

## 2019-09-08 PROCEDURE — 6370000000 HC RX 637 (ALT 250 FOR IP): Performed by: PHYSICIAN ASSISTANT

## 2019-09-08 PROCEDURE — 84703 CHORIONIC GONADOTROPIN ASSAY: CPT

## 2019-09-08 RX ORDER — KETOROLAC TROMETHAMINE 10 MG/1
10 TABLET, FILM COATED ORAL ONCE
Status: COMPLETED | OUTPATIENT
Start: 2019-09-08 | End: 2019-09-08

## 2019-09-08 RX ORDER — METOCLOPRAMIDE 10 MG/1
10 TABLET ORAL ONCE
Status: COMPLETED | OUTPATIENT
Start: 2019-09-08 | End: 2019-09-08

## 2019-09-08 RX ADMIN — METOCLOPRAMIDE HYDROCHLORIDE 10 MG: 10 TABLET ORAL at 23:16

## 2019-09-08 RX ADMIN — KETOROLAC TROMETHAMINE 10 MG: 10 TABLET, FILM COATED ORAL at 23:16

## 2019-09-08 ASSESSMENT — PAIN DESCRIPTION - PAIN TYPE: TYPE: ACUTE PAIN

## 2019-09-08 ASSESSMENT — PAIN SCALES - GENERAL
PAINLEVEL_OUTOF10: 9
PAINLEVEL_OUTOF10: 9

## 2019-09-08 ASSESSMENT — PAIN DESCRIPTION - LOCATION: LOCATION: HEAD

## 2019-09-09 VITALS
WEIGHT: 248 LBS | BODY MASS INDEX: 38.92 KG/M2 | SYSTOLIC BLOOD PRESSURE: 141 MMHG | HEIGHT: 67 IN | OXYGEN SATURATION: 94 % | DIASTOLIC BLOOD PRESSURE: 51 MMHG | TEMPERATURE: 98.1 F | RESPIRATION RATE: 16 BRPM | HEART RATE: 67 BPM

## 2019-09-09 RX ORDER — ONDANSETRON 4 MG/1
4 TABLET, ORALLY DISINTEGRATING ORAL EVERY 8 HOURS PRN
Qty: 20 TABLET | Refills: 0 | Status: SHIPPED | OUTPATIENT
Start: 2019-09-09 | End: 2020-11-02 | Stop reason: ALTCHOICE

## 2019-09-09 RX ORDER — SUMATRIPTAN 100 MG/1
100 TABLET, FILM COATED ORAL
Qty: 9 TABLET | Refills: 3 | Status: SHIPPED | OUTPATIENT
Start: 2019-09-09 | End: 2022-10-04

## 2019-09-09 NOTE — ED PROVIDER NOTES
DAY BEFORE MEALS 30 mL 3    Insulin Pen Needle ([employer] PEN NEEDLES 31G) 31G X 8 MM MISC 1 each by Does not apply route 4 times daily DISPENSE ANY INSURANCE COVERED BRAND 130 each 10    aspirin 81 MG tablet Take 81 mg by mouth daily      ibuprofen (ADVIL;MOTRIN) 600 MG tablet Take 1 tablet by mouth every 8 hours as needed for Pain 30 tablet 0    rosuvastatin (CRESTOR) 5 MG tablet TAKE ONE TABLET BY MOUTH EVERY OTHER DAY 15 tablet 9    blood glucose test strips (FREESTYLE LITE) strip USE TO TEST BLOOD GLUCOSE LEVELS THREE TIMES A DAY 50 strip 5    Blood Glucose Monitoring Suppl (FREESTYLE LITE) MIRZA Test BS 3 times a day. On insulin Dx Code E11.9 1 Device 0    FREESTYLE LANCETS MISC Test BS 3 times a day. On insulin Dx Code E11.9 100 each 3    sertraline (ZOLOFT) 100 MG tablet Take 100 mg by mouth 2 times daily       hydrochlorothiazide (HYDRODIURIL) 50 MG tablet Take 50 mg by mouth daily      fluocinonide (LIDEX) 0.05 % ointment Apply sparingly to affected area(s) up to bid prn 60 g 0    lisinopril (PRINIVIL;ZESTRIL) 40 MG tablet Take 1 tablet by mouth daily 30 tablet 3     Allergies   Allergen Reactions    Latex Hives    Codeine      Upsets her abd     Contrast [Iodides]     Janumet [Sitagliptin-Metformin Hcl] Other (See Comments)     URI    Metformin And Related Diarrhea    Morphine And Related     Other      Dye   Dye     Dilaudid [Hydromorphone Hcl] Nausea And Vomiting     Severe illness    Hydromorphone Nausea And Vomiting     Severe illness  Nausea     Morphine Nausea And Vomiting and Other (See Comments)     Headache and sweats       REVIEW OF SYSTEMS  10 systems reviewed, pertinent positives per HPI otherwise noted to be negative    PHYSICAL EXAM  BP (!) 141/51   Pulse 67   Temp 98.1 °F (36.7 °C) (Oral)   Resp 16   Ht 5' 7\" (1.702 m)   Wt 248 lb (112.5 kg)   SpO2 94%   BMI 38.84 kg/m²   GENERAL APPEARANCE: Awake and alert. Cooperative. No acute distress. HEAD: Normocephalic.

## 2019-10-11 ENCOUNTER — OFFICE VISIT (OUTPATIENT)
Dept: ENDOCRINOLOGY | Age: 49
End: 2019-10-11
Payer: COMMERCIAL

## 2019-10-11 VITALS
RESPIRATION RATE: 16 BRPM | HEIGHT: 67 IN | BODY MASS INDEX: 38.92 KG/M2 | SYSTOLIC BLOOD PRESSURE: 138 MMHG | DIASTOLIC BLOOD PRESSURE: 88 MMHG | WEIGHT: 248 LBS | OXYGEN SATURATION: 92 % | HEART RATE: 90 BPM

## 2019-10-11 DIAGNOSIS — I10 ESSENTIAL HYPERTENSION: ICD-10-CM

## 2019-10-11 DIAGNOSIS — E11.9 DIABETES MELLITUS WITHOUT COMPLICATION (HCC): Primary | ICD-10-CM

## 2019-10-11 DIAGNOSIS — E55.9 VITAMIN D DEFICIENCY: ICD-10-CM

## 2019-10-11 DIAGNOSIS — E78.2 MIXED HYPERLIPIDEMIA: ICD-10-CM

## 2019-10-11 LAB — HBA1C MFR BLD: 12 %

## 2019-10-11 PROCEDURE — 83036 HEMOGLOBIN GLYCOSYLATED A1C: CPT | Performed by: INTERNAL MEDICINE

## 2019-10-11 PROCEDURE — 3046F HEMOGLOBIN A1C LEVEL >9.0%: CPT | Performed by: INTERNAL MEDICINE

## 2019-10-11 PROCEDURE — G8484 FLU IMMUNIZE NO ADMIN: HCPCS | Performed by: INTERNAL MEDICINE

## 2019-10-11 PROCEDURE — G8417 CALC BMI ABV UP PARAM F/U: HCPCS | Performed by: INTERNAL MEDICINE

## 2019-10-11 PROCEDURE — G8427 DOCREV CUR MEDS BY ELIG CLIN: HCPCS | Performed by: INTERNAL MEDICINE

## 2019-10-11 PROCEDURE — 4004F PT TOBACCO SCREEN RCVD TLK: CPT | Performed by: INTERNAL MEDICINE

## 2019-10-11 PROCEDURE — 99214 OFFICE O/P EST MOD 30 MIN: CPT | Performed by: INTERNAL MEDICINE

## 2019-10-11 PROCEDURE — 2022F DILAT RTA XM EVC RTNOPTHY: CPT | Performed by: INTERNAL MEDICINE

## 2019-10-26 DIAGNOSIS — E55.9 VITAMIN D DEFICIENCY: ICD-10-CM

## 2019-10-28 RX ORDER — ERGOCALCIFEROL 1.25 MG/1
CAPSULE ORAL
Qty: 4 CAPSULE | Refills: 1 | Status: SHIPPED | OUTPATIENT
Start: 2019-10-28 | End: 2019-12-23

## 2019-11-04 ENCOUNTER — OFFICE VISIT (OUTPATIENT)
Dept: DERMATOLOGY | Age: 49
End: 2019-11-04
Payer: MEDICARE

## 2019-11-04 DIAGNOSIS — L82.1 SEBORRHEIC KERATOSES: ICD-10-CM

## 2019-11-04 DIAGNOSIS — L40.0 PLAQUE PSORIASIS: Primary | ICD-10-CM

## 2019-11-04 PROCEDURE — 99213 OFFICE O/P EST LOW 20 MIN: CPT | Performed by: DERMATOLOGY

## 2019-11-04 PROCEDURE — G8417 CALC BMI ABV UP PARAM F/U: HCPCS | Performed by: DERMATOLOGY

## 2019-11-04 PROCEDURE — G8484 FLU IMMUNIZE NO ADMIN: HCPCS | Performed by: DERMATOLOGY

## 2019-11-04 PROCEDURE — 4004F PT TOBACCO SCREEN RCVD TLK: CPT | Performed by: DERMATOLOGY

## 2019-11-04 PROCEDURE — G8427 DOCREV CUR MEDS BY ELIG CLIN: HCPCS | Performed by: DERMATOLOGY

## 2019-11-13 ENCOUNTER — TELEPHONE (OUTPATIENT)
Dept: ENDOCRINOLOGY | Age: 49
End: 2019-11-13

## 2019-11-13 DIAGNOSIS — E11.9 DIABETES MELLITUS WITHOUT COMPLICATION (HCC): ICD-10-CM

## 2019-12-17 DIAGNOSIS — E11.9 DIABETES MELLITUS WITHOUT COMPLICATION (HCC): Primary | ICD-10-CM

## 2019-12-21 DIAGNOSIS — E55.9 VITAMIN D DEFICIENCY: ICD-10-CM

## 2019-12-23 RX ORDER — INSULIN LISPRO 100 U/ML
INJECTION, SOLUTION SUBCUTANEOUS
Qty: 10 PEN | Refills: 2 | Status: SHIPPED | OUTPATIENT
Start: 2019-12-23

## 2019-12-24 RX ORDER — ERGOCALCIFEROL 1.25 MG/1
CAPSULE ORAL
Qty: 4 CAPSULE | Refills: 3 | Status: SHIPPED | OUTPATIENT
Start: 2019-12-24

## 2020-02-03 ENCOUNTER — TELEPHONE (OUTPATIENT)
Dept: ENDOCRINOLOGY | Age: 50
End: 2020-02-03

## 2020-02-03 RX ORDER — INSULIN LISPRO 100 [IU]/ML
INJECTION, SOLUTION INTRAVENOUS; SUBCUTANEOUS
Qty: 10 PEN | Refills: 3 | Status: SHIPPED | OUTPATIENT
Start: 2020-02-03 | End: 2022-10-04 | Stop reason: SINTOL

## 2020-02-03 NOTE — TELEPHONE ENCOUNTER
Received PA for Admelog. Pt has new insurance. Card not on file. New insurance prefers insulin lispro or humalog.

## 2020-05-15 RX ORDER — BLOOD-GLUCOSE METER
KIT MISCELLANEOUS
Qty: 50 STRIP | Refills: 4 | Status: SHIPPED | OUTPATIENT
Start: 2020-05-15

## 2020-10-29 NOTE — PROGRESS NOTES
[facility]) Dermatology  Carlos Eaton MD  [phone]      Pepper Grams  1970    52 y.o. female     Date of Visit: 11/2/2020    Last Visit: 1yr    Chief Complaint: Psoriasis    History of Present Illness:  1. Follow-up for Plaque psoriasis - <1% BSA at last visit 2019.   -Continues to develop intermittent lesions on arms and legs. Ran out of lidex oint several mo ago, so reports a few persistent lesions.   -No severe flares since last visit. Presenting history 2015:  5yr hx year history of psoriasis affecting the arms, legs. Exacerbating factors include: no known precipitant. Associated symptoms include: moderate pruritus.  -She has had the following treatments in the past with the associated response: topical steroids w/ clearance of lesions -- has not used anything in several yrs     History of cancer: negative  History of frequent infections: negative  History of hepatitis: negative  Alcohol use: positive  Tattoos: negative  Blood transfusions: negative  Personal or family history of multiple sclerosis or other demyelinating disorder: negative    2. New issue. 3d hx severely tender lesion of groin.   -History of abscesses of groin, buttocks (less than yearly). Previously treated w/ systemic antibiotics, I&D, home remedy of huffman and potato. -Denies fevers/chills     Review of Systems:  Constitutional: Has been dealing w/ back pain. MS: Denies joint swelling/pain. Allergies: Reviewed and updated. Past Medical History, Surgical History, Medications and Allergies reviewed.      Past Medical History:   Diagnosis Date    Anxiety     Hypertension     Ovarian cyst     Type II or unspecified type diabetes mellitus without mention of complication, not stated as uncontrolled      Past Surgical History:   Procedure Laterality Date    ABDOMEN SURGERY      bladder mesh on March 5 2019    APPENDECTOMY      BACK SURGERY      CHOLECYSTECTOMY      DILATION AND CURETTAGE OF UTERUS      ENDOMETRIAL ABLATION      INCONTINENCE SURGERY      MIDDLE EAR SURGERY  10/2002    left ossicular reconstrucion    TUBAL LIGATION         Allergies   Allergen Reactions    Latex Hives    Codeine      Upsets her abd     Contrast [Iodides]     Janumet [Sitagliptin-Metformin Hcl] Other (See Comments)     URI    Metformin And Related Diarrhea    Morphine And Related     Other      Dye   Dye     Dilaudid [Hydromorphone Hcl] Nausea And Vomiting     Severe illness    Hydromorphone Nausea And Vomiting     Severe illness  Nausea     Morphine Nausea And Vomiting and Other (See Comments)     Headache and sweats     Outpatient Medications Marked as Taking for the 11/2/20 encounter (Office Visit) with Carlos Eaton MD   Medication Sig Dispense Refill    citalopram (CELEXA) 20 MG tablet Take 20 mg by mouth daily      fluocinonide (LIDEX) 0.05 % ointment Apply sparingly to affected area(s) up to bid prn 60 g 0    blood glucose test strips (FREESTYLE LITE) strip USE TO TEST BLOOD GLUCOSE LEVELS THREE TIMES A DAY 50 strip 4    insulin lispro, 1 Unit Dial, (HUMALOG KWIKPEN) 100 UNIT/ML SOPN INJECT 18-26 UNITS SUBCUTANEOUSLY THREE TIMES DAILY BEFORE MEALS 10 pen 3    vitamin D (ERGOCALCIFEROL) 1.25 MG (94551 UT) CAPS capsule TAKE ONE CAPSULE BY MOUTH ONCE WEEKLY 4 capsule 3    ADMELOG SOLOSTAR 100 UNIT/ML SOPN INJECT 18-26 UNITS SUBCUTANEOUSLY THREE TIMES DAILY BEFORE MEALS 10 pen 2    insulin glargine (LANTUS SOLOSTAR) 100 UNIT/ML injection pen Inject 80 Units into the skin 2 times daily 10 pen 3    Insulin Pen Needle ([employer] PEN NEEDLES 31G) 31G X 8 MM MISC 1 each by Does not apply route 4 times daily DISPENSE ANY INSURANCE COVERED BRAND 130 each 10    ibuprofen (ADVIL;MOTRIN) 600 MG tablet Take 1 tablet by mouth every 8 hours as needed for Pain 30 tablet 0    Blood Glucose Monitoring Suppl (FREESTYLE LITE) MIRZA Test BS 3 times a day. On insulin Dx Code E11.9 1 Device 0    FREESTYLE LANCETS MISC Test BS 3 times a day.  On insulin Dx Code E11.9 100 each 3    hydrochlorothiazide (HYDRODIURIL) 50 MG tablet Take 50 mg by mouth daily      lisinopril (PRINIVIL;ZESTRIL) 40 MG tablet Take 1 tablet by mouth daily 30 tablet 3     Social history: Father passed 5/2019, mother passed 12/2019    Physical Examination     The following were examined and determined to be normal: Psych/Neuro. The following were examined and determined to be abnormal: LLE, Genitalia/Buttocks    -General: Well-appearing, NAD  1. L thigh - round mildly scaly thin pink papules   2. L labia majora - 2.5cm slightly fluctuant mildly erythematous nodule     Assessment and Plan     1. Plaque psoriasis - <1% BSA, recently flaring off treatment   -Reviewed chronicity, use of topical steroids for flares   -Resume lidex oint bid prn flares. Edu re: sparing use, atrophy, striae, hypopigmentation, telangiectasias.       2. Abscess of L labia majora   -Offered incision and drainage of lesion, but when informed that the lesion would not be packed, pt declined procedure (states that she has an appt w/ gyn tomorrow, where she would like the procedure performed)   -Keflex 500mg PO tid x 10d   -Advised warm compresses tid until lesion starts to drain

## 2020-11-02 ENCOUNTER — OFFICE VISIT (OUTPATIENT)
Dept: DERMATOLOGY | Age: 50
End: 2020-11-02
Payer: MEDICARE

## 2020-11-02 VITALS — TEMPERATURE: 97.4 F

## 2020-11-02 PROCEDURE — 99214 OFFICE O/P EST MOD 30 MIN: CPT | Performed by: DERMATOLOGY

## 2020-11-02 RX ORDER — FLUOCINONIDE 0.5 MG/G
OINTMENT TOPICAL
Qty: 60 G | Refills: 0 | Status: SHIPPED | OUTPATIENT
Start: 2020-11-02

## 2020-11-02 RX ORDER — CEPHALEXIN 500 MG/1
500 CAPSULE ORAL 3 TIMES DAILY
Qty: 30 CAPSULE | Refills: 0 | Status: SHIPPED | OUTPATIENT
Start: 2020-11-02 | End: 2020-11-12

## 2020-11-02 RX ORDER — CITALOPRAM 20 MG/1
40 TABLET ORAL DAILY
COMMUNITY
Start: 2019-12-16

## 2020-11-25 ENCOUNTER — APPOINTMENT (OUTPATIENT)
Dept: GENERAL RADIOLOGY | Age: 50
End: 2020-11-25
Payer: MEDICARE

## 2020-11-25 ENCOUNTER — HOSPITAL ENCOUNTER (EMERGENCY)
Age: 50
Discharge: HOME | End: 2020-11-25
Payer: MEDICARE

## 2020-11-25 VITALS
WEIGHT: 254 LBS | TEMPERATURE: 98.2 F | BODY MASS INDEX: 39.87 KG/M2 | OXYGEN SATURATION: 96 % | DIASTOLIC BLOOD PRESSURE: 95 MMHG | HEART RATE: 84 BPM | SYSTOLIC BLOOD PRESSURE: 121 MMHG | RESPIRATION RATE: 16 BRPM | HEIGHT: 67 IN

## 2020-11-25 LAB
A/G RATIO: 1 (ref 1.1–2.2)
ALBUMIN SERPL-MCNC: 3.9 G/DL (ref 3.4–5)
ALP BLD-CCNC: 114 U/L (ref 40–129)
ALT SERPL-CCNC: 78 U/L (ref 10–40)
ANION GAP SERPL CALCULATED.3IONS-SCNC: 7 MMOL/L (ref 3–16)
AST SERPL-CCNC: 56 U/L (ref 15–37)
BASOPHILS ABSOLUTE: 0.1 K/UL (ref 0–0.2)
BASOPHILS RELATIVE PERCENT: 1 %
BILIRUB SERPL-MCNC: 0.3 MG/DL (ref 0–1)
BILIRUBIN URINE: NEGATIVE
BLOOD, URINE: NEGATIVE
BUN BLDV-MCNC: 8 MG/DL (ref 7–20)
CALCIUM SERPL-MCNC: 9.4 MG/DL (ref 8.3–10.6)
CHLORIDE BLD-SCNC: 100 MMOL/L (ref 99–110)
CLARITY: CLEAR
CO2: 27 MMOL/L (ref 21–32)
COLOR: YELLOW
CREAT SERPL-MCNC: <0.5 MG/DL (ref 0.6–1.1)
D DIMER: <200 NG/ML DDU (ref 0–229)
EOSINOPHILS ABSOLUTE: 0.2 K/UL (ref 0–0.6)
EOSINOPHILS RELATIVE PERCENT: 2.3 %
GFR AFRICAN AMERICAN: >60
GFR NON-AFRICAN AMERICAN: >60
GLOBULIN: 4 G/DL
GLUCOSE BLD-MCNC: 340 MG/DL (ref 70–99)
GLUCOSE URINE: >=1000 MG/DL
HCG QUALITATIVE: NEGATIVE
HCT VFR BLD CALC: 44.2 % (ref 36–48)
HEMOGLOBIN: 15 G/DL (ref 12–16)
INR BLD: 0.99 (ref 0.86–1.14)
KETONES, URINE: NEGATIVE MG/DL
LEUKOCYTE ESTERASE, URINE: NEGATIVE
LIPASE: 34 U/L (ref 13–60)
LYMPHOCYTES ABSOLUTE: 4 K/UL (ref 1–5.1)
LYMPHOCYTES RELATIVE PERCENT: 42.6 %
MCH RBC QN AUTO: 29.4 PG (ref 26–34)
MCHC RBC AUTO-ENTMCNC: 33.9 G/DL (ref 31–36)
MCV RBC AUTO: 86.8 FL (ref 80–100)
MICROSCOPIC EXAMINATION: ABNORMAL
MONOCYTES ABSOLUTE: 0.6 K/UL (ref 0–1.3)
MONOCYTES RELATIVE PERCENT: 6.7 %
NEUTROPHILS ABSOLUTE: 4.5 K/UL (ref 1.7–7.7)
NEUTROPHILS RELATIVE PERCENT: 47.4 %
NITRITE, URINE: NEGATIVE
PDW BLD-RTO: 13.8 % (ref 12.4–15.4)
PH UA: 5.5 (ref 5–8)
PLATELET # BLD: 193 K/UL (ref 135–450)
PMV BLD AUTO: 9.1 FL (ref 5–10.5)
POTASSIUM REFLEX MAGNESIUM: 4.4 MMOL/L (ref 3.5–5.1)
PRO-BNP: <5 PG/ML (ref 0–124)
PROTEIN UA: NEGATIVE MG/DL
PROTHROMBIN TIME: 11.5 SEC (ref 10–13.2)
RAPID INFLUENZA  B AGN: NEGATIVE
RAPID INFLUENZA A AGN: NEGATIVE
RBC # BLD: 5.09 M/UL (ref 4–5.2)
SARS-COV-2, NAAT: DETECTED
SODIUM BLD-SCNC: 134 MMOL/L (ref 136–145)
SPECIFIC GRAVITY UA: 1.02 (ref 1–1.03)
TOTAL PROTEIN: 7.9 G/DL (ref 6.4–8.2)
TROPONIN: <0.01 NG/ML
URINE REFLEX TO CULTURE: ABNORMAL
URINE TYPE: ABNORMAL
UROBILINOGEN, URINE: 0.2 E.U./DL
WBC # BLD: 9.5 K/UL (ref 4–11)

## 2020-11-25 PROCEDURE — 6370000000 HC RX 637 (ALT 250 FOR IP): Performed by: PHYSICIAN ASSISTANT

## 2020-11-25 PROCEDURE — 2580000003 HC RX 258: Performed by: PHYSICIAN ASSISTANT

## 2020-11-25 PROCEDURE — 83880 ASSAY OF NATRIURETIC PEPTIDE: CPT

## 2020-11-25 PROCEDURE — 71045 X-RAY EXAM CHEST 1 VIEW: CPT

## 2020-11-25 PROCEDURE — 80053 COMPREHEN METABOLIC PANEL: CPT

## 2020-11-25 PROCEDURE — 99284 EMERGENCY DEPT VISIT MOD MDM: CPT

## 2020-11-25 PROCEDURE — 96374 THER/PROPH/DIAG INJ IV PUSH: CPT

## 2020-11-25 PROCEDURE — 83690 ASSAY OF LIPASE: CPT

## 2020-11-25 PROCEDURE — 85610 PROTHROMBIN TIME: CPT

## 2020-11-25 PROCEDURE — 84484 ASSAY OF TROPONIN QUANT: CPT

## 2020-11-25 PROCEDURE — 81003 URINALYSIS AUTO W/O SCOPE: CPT

## 2020-11-25 PROCEDURE — 84703 CHORIONIC GONADOTROPIN ASSAY: CPT

## 2020-11-25 PROCEDURE — 93005 ELECTROCARDIOGRAM TRACING: CPT | Performed by: PHYSICIAN ASSISTANT

## 2020-11-25 PROCEDURE — U0002 COVID-19 LAB TEST NON-CDC: HCPCS

## 2020-11-25 PROCEDURE — 6360000002 HC RX W HCPCS: Performed by: PHYSICIAN ASSISTANT

## 2020-11-25 PROCEDURE — 85025 COMPLETE CBC W/AUTO DIFF WBC: CPT

## 2020-11-25 PROCEDURE — 85379 FIBRIN DEGRADATION QUANT: CPT

## 2020-11-25 PROCEDURE — 87804 INFLUENZA ASSAY W/OPTIC: CPT

## 2020-11-25 RX ORDER — AZITHROMYCIN 250 MG/1
500 TABLET, FILM COATED ORAL ONCE
Status: COMPLETED | OUTPATIENT
Start: 2020-11-25 | End: 2020-11-25

## 2020-11-25 RX ORDER — IBUPROFEN 600 MG/1
600 TABLET ORAL
Qty: 21 TABLET | Refills: 0 | Status: SHIPPED | OUTPATIENT
Start: 2020-11-25 | End: 2020-12-02

## 2020-11-25 RX ORDER — 0.9 % SODIUM CHLORIDE 0.9 %
1000 INTRAVENOUS SOLUTION INTRAVENOUS ONCE
Status: COMPLETED | OUTPATIENT
Start: 2020-11-25 | End: 2020-11-25

## 2020-11-25 RX ORDER — KETOROLAC TROMETHAMINE 30 MG/ML
30 INJECTION, SOLUTION INTRAMUSCULAR; INTRAVENOUS ONCE
Status: COMPLETED | OUTPATIENT
Start: 2020-11-25 | End: 2020-11-25

## 2020-11-25 RX ORDER — AZITHROMYCIN 250 MG/1
TABLET, FILM COATED ORAL
Qty: 1 PACKET | Refills: 0 | Status: SHIPPED | OUTPATIENT
Start: 2020-11-25 | End: 2020-12-05

## 2020-11-25 RX ORDER — ACETAMINOPHEN 500 MG
1000 TABLET ORAL
Qty: 30 TABLET | Refills: 0 | Status: SHIPPED | OUTPATIENT
Start: 2020-11-25 | End: 2022-10-04

## 2020-11-25 RX ADMIN — AZITHROMYCIN MONOHYDRATE 500 MG: 250 TABLET ORAL at 19:55

## 2020-11-25 RX ADMIN — KETOROLAC TROMETHAMINE 30 MG: 30 INJECTION, SOLUTION INTRAMUSCULAR at 19:55

## 2020-11-25 RX ADMIN — SODIUM CHLORIDE 1000 ML: 9 INJECTION, SOLUTION INTRAVENOUS at 19:55

## 2020-11-25 ASSESSMENT — PAIN SCALES - GENERAL
PAINLEVEL_OUTOF10: 5
PAINLEVEL_OUTOF10: 5
PAINLEVEL_OUTOF10: 2

## 2020-11-25 NOTE — ED PROVIDER NOTES
EKG interpretation:  Normal sinus rhythm, rate 90, no acute ST changes, T wave inversions, or ectopy. Leftward axis.   No significant changes when compared with prior from 12/21/2015     Robert Guillen MD  11/25/20 1592

## 2020-11-25 NOTE — ED PROVIDER NOTES
[facility]  ED  EMERGENCY DEPARTMENT ENCOUNTER        Pt Name: Aniceto Orozco  MRN: [id number]  [place] 1970  Date of evaluation: 11/25/2020  Provider: Miladys Welsh PA-C  PCP: ROGELIO Viramontes - CNP    ADY. I have evaluated this patient. My supervising physician was available for consultation. Abdirahman Trevizo MD      CHIEF COMPLAINT       Chief Complaint   Patient presents with    Back Pain     pt c/o right side back pain. pt state that she was dx with the flu and daughter dx with COVID pt state that she started last night with right sided back pain       HISTORY OF PRESENT ILLNESS   (Location, Timing/Onset, Context/Setting, Quality, Duration, Modifying Factors, Severity, Associated Signs and Symptoms)  Note limiting factors. Aniceto Orozco is a 52 y.o. female patient presenting with right chest pain worse than left. Worse with movement and touch much less with deep breath. Patient reports symptoms began Friday or Saturday. Saturday morning she went to local urgent care with complaint of fever, myalgia, headache, nausea and loose stool. No change in taste or smell. She does state that her flu test positive for type a and a Covid study negative. Her daughter has similar complaints with loss of taste and smell which was tested and is positive for Covid. Patient reports no specific treatment upon leaving urgent care on Saturday. The patient reports a p.m. last night right chest discomfort worse than left. She indicates no hemoptysis. Slight cough. No productivity. No respiratory distress. Her O2 saturation room air 96% - 98%. Respiratory 16 she is mildly tachycardic range between 95 and 111. Afebrile at 98.2. Last dose ibuprofen 6 and milligrams yesterday. No antipyretic analgesia today. Nursing Notes were all reviewed and agreed with or any disagreements were addressed in the HPI.     REVIEW OF SYSTEMS    (2-9 systems for level 4, 10 or more for level 5) by mouth daily    ROSUVASTATIN (CRESTOR) 5 MG TABLET    TAKE ONE TABLET BY MOUTH EVERY OTHER DAY    SUMATRIPTAN (IMITREX) 100 MG TABLET    Take 1 tablet by mouth once as needed for Migraine (Do not exceed more than 2 tablets in a 24-hour period.)    VITAMIN D (ERGOCALCIFEROL) 1.25 MG (98837 UT) CAPS CAPSULE    TAKE ONE CAPSULE BY MOUTH ONCE WEEKLY         ALLERGIES     Latex; Codeine; Contrast [iodides]; Janumet [sitagliptin-metformin hcl]; Metformin and related; Morphine and related; Other; Dilaudid [hydromorphone hcl]; Hydromorphone; and Morphine    FAMILYHISTORY     History reviewed. No pertinent family history. SOCIAL HISTORY       Social History     Tobacco Use    Smoking status: Current Every Day Smoker     Packs/day: 0.50     Types: Cigarettes    Smokeless tobacco: Never Used    Tobacco comment: 3 cigarettes daily   Substance Use Topics    Alcohol use: Yes     Comment: rare    Drug use: Never       SCREENINGS             PHYSICAL EXAM    (up to 7 for level 4, 8 or more for level 5)     ED Triage Vitals   BP Temp Temp Source Pulse Resp SpO2 Height Weight   11/25/20 1807 11/25/20 1807 11/25/20 1807 11/25/20 1715 11/25/20 1807 11/25/20 1715 11/25/20 1807 11/25/20 1807   (!) 142/82 98.2 °F (36.8 °C) Oral 111 16 98 % 5' 7\" (1.702 m) 254 lb (115.2 kg)       Physical Exam  Vitals signs and nursing note reviewed. Constitutional:       Appearance: Normal appearance. She is well-developed. She is obese. HENT:      Head: Normocephalic and atraumatic. Right Ear: External ear normal.      Left Ear: External ear normal.   Eyes:      General: No scleral icterus. Right eye: No discharge. Left eye: No discharge. Conjunctiva/sclera: Conjunctivae normal.   Neck:      Musculoskeletal: Normal range of motion and neck supple. Cardiovascular:      Rate and Rhythm: Normal rate and regular rhythm. Heart sounds: Normal heart sounds.    Pulmonary:      Effort: Pulmonary effort is normal. Breath sounds: Normal breath sounds. Comments: Patient has a large area of the right chest which she states is tender when I apply gentle and firm pressures. No crepitation or instability. No erythema. No rash. Chest:      Chest wall: Tenderness present. Abdominal:      General: Abdomen is flat. Bowel sounds are normal.      Palpations: Abdomen is soft. Tenderness: There is no abdominal tenderness. There is no right CVA tenderness or left CVA tenderness. Musculoskeletal: Normal range of motion. Right lower leg: No edema. Left lower leg: No edema. Skin:     General: Skin is warm and dry. Neurological:      General: No focal deficit present. Mental Status: She is alert and oriented to person, place, and time. Mental status is at baseline. Psychiatric:         Mood and Affect: Mood normal.         Behavior: Behavior normal.         Thought Content:  Thought content normal.         Judgment: Judgment normal.         DIAGNOSTIC RESULTS   LABS:    Labs Reviewed   COMPREHENSIVE METABOLIC PANEL W/ REFLEX TO MG FOR LOW K - Abnormal; Notable for the following components:       Result Value    Sodium 134 (*)     Glucose 340 (*)     CREATININE <0.5 (*)     Albumin/Globulin Ratio 1.0 (*)     ALT 78 (*)     AST 56 (*)     All other components within normal limits    Narrative:     Performed at:  [address],  [place], [address]   Phone [phone]   URINE RT REFLEX TO CULTURE - Abnormal; Notable for the following components:    Glucose, Ur >=1000 (*)     All other components within normal limits    Narrative:     Performed at:  [address],  [place], [address]   Phone [phone] - Abnormal; Notable for the following components:    SARS-CoV-2, NAAT DETECTED (*)     All other components within normal limits    Narrative:     Salomón Dillard tel. [phone],  Chemistry results called to and read back by Neyda ANDREWS, 11/25/2020  20:52, by Mohamud Ring  Performed at:  [address],  [address], [address]   Phone [phone]   RAPID INFLUENZA A/B ANTIGENS    Narrative:     Performed at:  [address],  [address], [address]   Phone [phone]   CBC WITH AUTO DIFFERENTIAL    Narrative:     Performed at:  [address],  [address], [address]   Phone [phone] PEPTIDE    Narrative:     Performed at:  [address],  [address], [address]   Phone [phone]   TROPONIN    Narrative:     Performed at:  [address],  [address], [address]   Phone [phone]   D-DIMER, QUANTITATIVE    Narrative:     Performed at:  [address],  [address], [address]   Phone [phone]   PROTIME-INR    Narrative:     Performed at:  [address],  [address], [address]   Phone [phone]   HCG, SERUM, QUALITATIVE    Narrative:     Performed at:  [address],  [address], [address]   Phone [phone]   LIPASE    Narrative:     Performed at:  [address],  [address], [address]   Phone [phone]       All other labs were within normal range or not returned as of this dictation. EKG: All EKG's are interpreted by the Emergency Department Physician in the absence of a cardiologist.  Please see their note for interpretation of EKG.       RADIOLOGY:   Non-plain film images such as CT, Ultrasound and MRI are read by the radiologist. Plain radiographic images are visualized and preliminarily interpreted by the ED Provider with the below findings:        Interpretation per the Radiologist below, if available at the time of this note:    XR CHEST PORTABLE   Final Result   Findings suspicious for interval development of right perihilar infiltrate           No results found. PROCEDURES   Unless otherwise noted below, none     Procedures    CRITICAL CARE TIME   N/A    CONSULTS:  None      EMERGENCY DEPARTMENT COURSE and DIFFERENTIAL DIAGNOSIS/MDM:   Vitals:    Vitals:    11/25/20 1715 11/25/20 1807 11/25/20 1956 11/25/20 2048   BP:  (!) 142/82 (!) 120/93 (!) 114/45   Pulse: 111 95 90 81   Resp:  16 16 16   Temp:  98.2 °F (36.8 °C)     TempSrc:  Oral     SpO2: 98% 96% 96% 98%   Weight:  254 lb (115.2 kg)     Height:  5' 7\" (1.702 m)         Patient was given the following medications:  Medications   0.9 % sodium chloride bolus (1,000 mLs Intravenous New Bag 11/25/20 1955)   ketorolac (TORADOL) injection 30 mg (30 mg Intravenous Given 11/25/20 1955)   azithromycin (ZITHROMAX) tablet 500 mg (500 mg Oral Given 11/25/20 1955)           Patient presenting with known influenza type a positive and Covid negative. Daughter Covid positive. Tonight our Covid study is positive. Our influenza study is negative for both A and B. Patient has early pulmonary changes on the right perihilar region. Azithromycin 500 mg given in the emergency room. Patient be sent home with continuation Z-Yan. She is given 1 L saline and Toradol 30 mg IV. Patient clinically improves. Patient is safe to be discharged to home as she is low risk for decompensation at this time. I have recommended OTC vitamin C 1000 mg twice daily and vitamin D 1000 units twice daily. I recommended aspirin 325 mg daily. She is advised to increase fluid intake. She is advised to maintain nutrition. Patient does not need a work note. She is disabled. Patient is aware to return if she has any breathing problems.   Patient does express understanding of her diagnosis and the treatment

## 2020-11-26 LAB
EKG ATRIAL RATE: 90 BPM
EKG DIAGNOSIS: NORMAL
EKG P AXIS: 51 DEGREES
EKG P-R INTERVAL: 152 MS
EKG Q-T INTERVAL: 378 MS
EKG QRS DURATION: 86 MS
EKG QTC CALCULATION (BAZETT): 462 MS
EKG R AXIS: -12 DEGREES
EKG T AXIS: 24 DEGREES
EKG VENTRICULAR RATE: 90 BPM

## 2020-11-26 PROCEDURE — 93010 ELECTROCARDIOGRAM REPORT: CPT | Performed by: INTERNAL MEDICINE

## 2020-11-26 NOTE — ED NOTES
Verbal and written discharge instructions given. IV removed. Prescriptions given to patient. Patient in stable condition, discharged home.      Jesica Mays RN  11/25/20 7433

## 2020-11-28 ENCOUNTER — CARE COORDINATION (OUTPATIENT)
Dept: CARE COORDINATION | Age: 50
End: 2020-11-28

## 2021-04-11 ENCOUNTER — APPOINTMENT (OUTPATIENT)
Dept: GENERAL RADIOLOGY | Age: 51
End: 2021-04-11
Payer: MEDICARE

## 2021-04-11 ENCOUNTER — HOSPITAL ENCOUNTER (EMERGENCY)
Age: 51
Discharge: HOME | End: 2021-04-11
Payer: MEDICARE

## 2021-04-11 VITALS
WEIGHT: 254 LBS | OXYGEN SATURATION: 95 % | BODY MASS INDEX: 39.87 KG/M2 | RESPIRATION RATE: 18 BRPM | TEMPERATURE: 98.8 F | HEART RATE: 94 BPM | SYSTOLIC BLOOD PRESSURE: 127 MMHG | HEIGHT: 67 IN | DIASTOLIC BLOOD PRESSURE: 87 MMHG

## 2021-04-11 DIAGNOSIS — S90.122A CONTUSION OF LESSER TOE OF LEFT FOOT WITHOUT DAMAGE TO NAIL, INITIAL ENCOUNTER: Primary | ICD-10-CM

## 2021-04-11 PROCEDURE — 99283 EMERGENCY DEPT VISIT LOW MDM: CPT

## 2021-04-11 PROCEDURE — 73630 X-RAY EXAM OF FOOT: CPT

## 2021-04-11 ASSESSMENT — PAIN SCALES - GENERAL: PAINLEVEL_OUTOF10: 5

## 2021-04-11 ASSESSMENT — PAIN DESCRIPTION - LOCATION: LOCATION: TOE (COMMENT WHICH ONE)

## 2021-04-13 NOTE — ED PROVIDER NOTES
This patient was not seen and evaluated by the attending physician. CHIEF COMPLAINT  Toe Injury (pt states she was walking to the bathroom and she kicked a boot with her left foot. Pinky toe on the left foot)      HISTORY OF PRESENT ILLNESS  Kim Hudson is a 52 y.o. female who presents to the ED for evaluation of left fifth toe pain. Patient states that she was walking the bathroom when she accidentally kicked a issue with her foot, she has some bruising noted to the left foot though she is concerned for possible fracture. She denies any other injury or complaint. States that it is tender to touch and hurts when she walks. She is here for further evaluation. LOCATION:left 5th toe  QUALITY:ache  SEVERITY:5  DURATION:yesterday  MODIFYING FACTORS:none noted    No other complaints, modifying factors or associated symptoms. Nursing notes reviewed. Past Medical History:   Diagnosis Date    Anxiety     Hypertension     Ovarian cyst     Type II or unspecified type diabetes mellitus without mention of complication, not stated as uncontrolled      Past Surgical History:   Procedure Laterality Date    ABDOMEN SURGERY      bladder mesh on March 5 2019    APPENDECTOMY      BACK SURGERY      CHOLECYSTECTOMY      DILATION AND CURETTAGE OF UTERUS      ENDOMETRIAL ABLATION      INCONTINENCE SURGERY      MIDDLE EAR SURGERY  10/2002    left ossicular reconstrucion    TUBAL LIGATION       History reviewed. No pertinent family history.   Social History     Socioeconomic History    Marital status: Legally      Spouse name: Not on file    Number of children: Not on file    Years of education: Not on file    Highest education level: Not on file   Occupational History    Not on file   Social Needs    Financial resource strain: Not on file    Food insecurity     Worry: Not on file     Inability: Not on file    Transportation needs     Medical: Not on file     Non-medical: Not on file Tobacco Use    Smoking status: Current Every Day Smoker     Packs/day: 0.50     Types: Cigarettes    Smokeless tobacco: Never Used    Tobacco comment: 3 cigarettes daily   Substance and Sexual Activity    Alcohol use: Yes     Comment: rare    Drug use: Never    Sexual activity: Not on file   Lifestyle    Physical activity     Days per week: Not on file     Minutes per session: Not on file    Stress: Not on file   Relationships    Social connections     Talks on phone: Not on file     Gets together: Not on file     Attends Latter day service: Not on file     Active member of club or organization: Not on file     Attends meetings of clubs or organizations: Not on file     Relationship status: Not on file    Intimate partner violence     Fear of current or ex partner: Not on file     Emotionally abused: Not on file     Physically abused: Not on file     Forced sexual activity: Not on file   Other Topics Concern    Not on file   Social History Narrative    Not on file     No current facility-administered medications for this encounter.       Current Outpatient Medications   Medication Sig Dispense Refill    ibuprofen (ADVIL;MOTRIN) 600 MG tablet Take 1 tablet by mouth 3 times daily (with meals) for 7 days 21 tablet 0    acetaminophen (TYLENOL) 500 MG tablet Take 2 tablets by mouth 3 times daily (with meals) 30 tablet 0    citalopram (CELEXA) 20 MG tablet Take 20 mg by mouth daily      fluocinonide (LIDEX) 0.05 % ointment Apply sparingly to affected area(s) up to bid prn 60 g 0    blood glucose test strips (FREESTYLE LITE) strip USE TO TEST BLOOD GLUCOSE LEVELS THREE TIMES A DAY 50 strip 4    insulin lispro, 1 Unit Dial, (HUMALOG KWIKPEN) 100 UNIT/ML SOPN INJECT 18-26 UNITS SUBCUTANEOUSLY THREE TIMES DAILY BEFORE MEALS 10 pen 3    vitamin D (ERGOCALCIFEROL) 1.25 MG (92033 UT) CAPS capsule TAKE ONE CAPSULE BY MOUTH ONCE WEEKLY 4 capsule 3    ADMELOG SOLOSTAR 100 UNIT/ML SOPN INJECT 18-26 UNITS SUBCUTANEOUSLY THREE TIMES DAILY BEFORE MEALS 10 pen 2    insulin glargine (LANTUS SOLOSTAR) 100 UNIT/ML injection pen Inject 80 Units into the skin 2 times daily 10 pen 3    SUMAtriptan (IMITREX) 100 MG tablet Take 1 tablet by mouth once as needed for Migraine (Do not exceed more than 2 tablets in a 24-hour period.) 9 tablet 3    Insulin Pen Needle ([employer] PEN NEEDLES 31G) 31G X 8 MM MISC 1 each by Does not apply route 4 times daily DISPENSE ANY INSURANCE COVERED BRAND 130 each 10    aspirin 81 MG tablet Take 81 mg by mouth daily      rosuvastatin (CRESTOR) 5 MG tablet TAKE ONE TABLET BY MOUTH EVERY OTHER DAY (Patient not taking: Reported on 11/2/2020) 15 tablet 9    Blood Glucose Monitoring Suppl (FREESTYLE LITE) MIRZA Test BS 3 times a day. On insulin Dx Code E11.9 1 Device 0    FREESTYLE LANCETS MISC Test BS 3 times a day. On insulin Dx Code E11.9 100 each 3    hydrochlorothiazide (HYDRODIURIL) 50 MG tablet Take 50 mg by mouth daily      lisinopril (PRINIVIL;ZESTRIL) 40 MG tablet Take 1 tablet by mouth daily 30 tablet 3     Allergies   Allergen Reactions    Latex Hives    Codeine      Upsets her abd     Contrast [Iodides]     Janumet [Sitagliptin-Metformin Hcl] Other (See Comments)     URI    Metformin And Related Diarrhea    Morphine And Related     Other      Dye   Dye     Dilaudid [Hydromorphone Hcl] Nausea And Vomiting     Severe illness    Hydromorphone Nausea And Vomiting     Severe illness  Nausea     Morphine Nausea And Vomiting and Other (See Comments)     Headache and sweats       REVIEW OF SYSTEMS  6 systems reviewed, pertinent positives per HPI otherwise noted to be negative    PHYSICAL EXAM  /87   Pulse 94   Temp 98.8 °F (37.1 °C) (Oral)   Resp 18   Ht 5' 7\" (1.702 m)   Wt 254 lb (115.2 kg)   SpO2 95%   BMI 39.78 kg/m²   GENERAL APPEARANCE: Awake and alert. Cooperative. No acute distress. HEAD: Normocephalic. Atraumatic. EYES: No outward signs of trauma.  No obvious

## 2021-04-22 ENCOUNTER — HOSPITAL ENCOUNTER (OUTPATIENT)
Dept: MRI IMAGING | Age: 51
Discharge: HOME | End: 2021-04-22
Payer: MEDICARE

## 2021-04-22 DIAGNOSIS — S82.199A METAPHYSEAL FRACTURE OF PROXIMAL END OF TIBIA: ICD-10-CM

## 2021-04-22 PROCEDURE — 73718 MRI LOWER EXTREMITY W/O DYE: CPT

## 2021-08-19 ENCOUNTER — HOSPITAL ENCOUNTER (OUTPATIENT)
Dept: MRI IMAGING | Age: 51
Discharge: HOME | End: 2021-08-19
Payer: MEDICARE

## 2021-08-19 ENCOUNTER — HOSPITAL ENCOUNTER (OUTPATIENT)
Age: 51
Discharge: HOME | End: 2021-08-19
Payer: MEDICARE

## 2021-08-19 DIAGNOSIS — D16.22 BENIGN NEOPLASM OF LONG BONE OF LEFT LOWER EXTREMITY: ICD-10-CM

## 2021-08-19 LAB
BUN BLDV-MCNC: 10 MG/DL (ref 7–20)
CREAT SERPL-MCNC: 0.6 MG/DL (ref 0.6–1.1)
GFR AFRICAN AMERICAN: >60
GFR NON-AFRICAN AMERICAN: >60

## 2021-08-19 PROCEDURE — 73720 MRI LWR EXTREMITY W/O&W/DYE: CPT

## 2021-08-19 PROCEDURE — 84520 ASSAY OF UREA NITROGEN: CPT

## 2021-08-19 PROCEDURE — 6360000004 HC RX CONTRAST MEDICATION: Performed by: ORTHOPAEDIC SURGERY

## 2021-08-19 PROCEDURE — A9579 GAD-BASE MR CONTRAST NOS,1ML: HCPCS | Performed by: ORTHOPAEDIC SURGERY

## 2021-08-19 PROCEDURE — 36415 COLL VENOUS BLD VENIPUNCTURE: CPT

## 2021-08-19 PROCEDURE — 82565 ASSAY OF CREATININE: CPT

## 2021-08-19 RX ADMIN — GADOTERIDOL 20 ML: 279.3 INJECTION, SOLUTION INTRAVENOUS at 16:57

## 2021-09-01 ENCOUNTER — HOSPITAL ENCOUNTER (OUTPATIENT)
Age: 51
Discharge: HOME | End: 2021-09-01
Payer: MEDICARE

## 2021-09-01 LAB
BASOPHILS ABSOLUTE: 0.1 K/UL (ref 0–0.2)
BASOPHILS RELATIVE PERCENT: 0.5 %
C-REACTIVE PROTEIN: 26.2 MG/L (ref 0–5.1)
EOSINOPHILS ABSOLUTE: 0.3 K/UL (ref 0–0.6)
EOSINOPHILS RELATIVE PERCENT: 2.1 %
HCT VFR BLD CALC: 42.2 % (ref 36–48)
HEMOGLOBIN: 14.3 G/DL (ref 12–16)
LYMPHOCYTES ABSOLUTE: 3.8 K/UL (ref 1–5.1)
LYMPHOCYTES RELATIVE PERCENT: 31.6 %
MCH RBC QN AUTO: 29.6 PG (ref 26–34)
MCHC RBC AUTO-ENTMCNC: 33.9 G/DL (ref 31–36)
MCV RBC AUTO: 87.2 FL (ref 80–100)
MONOCYTES ABSOLUTE: 0.5 K/UL (ref 0–1.3)
MONOCYTES RELATIVE PERCENT: 4.5 %
NEUTROPHILS ABSOLUTE: 7.4 K/UL (ref 1.7–7.7)
NEUTROPHILS RELATIVE PERCENT: 61.3 %
PDW BLD-RTO: 13.8 % (ref 12.4–15.4)
PLATELET # BLD: 215 K/UL (ref 135–450)
PMV BLD AUTO: 9.5 FL (ref 5–10.5)
RBC # BLD: 4.84 M/UL (ref 4–5.2)
RHEUMATOID FACTOR: 11 IU/ML
SEDIMENTATION RATE, ERYTHROCYTE: 24 MM/HR (ref 0–20)
WBC # BLD: 12.1 K/UL (ref 4–11)

## 2021-09-01 PROCEDURE — 86431 RHEUMATOID FACTOR QUANT: CPT

## 2021-09-01 PROCEDURE — 86038 ANTINUCLEAR ANTIBODIES: CPT

## 2021-09-01 PROCEDURE — 86140 C-REACTIVE PROTEIN: CPT

## 2021-09-01 PROCEDURE — 85025 COMPLETE CBC W/AUTO DIFF WBC: CPT

## 2021-09-01 PROCEDURE — 36415 COLL VENOUS BLD VENIPUNCTURE: CPT

## 2021-09-01 PROCEDURE — 85652 RBC SED RATE AUTOMATED: CPT

## 2021-09-02 LAB — ANTI-NUCLEAR ANTIBODY (ANA): NEGATIVE

## 2021-11-02 ENCOUNTER — HOSPITAL ENCOUNTER (EMERGENCY)
Age: 51
Discharge: HOME | End: 2021-11-02
Attending: EMERGENCY MEDICINE
Payer: MEDICARE

## 2021-11-02 VITALS
BODY MASS INDEX: 40.81 KG/M2 | RESPIRATION RATE: 18 BRPM | DIASTOLIC BLOOD PRESSURE: 64 MMHG | HEART RATE: 101 BPM | TEMPERATURE: 99 F | SYSTOLIC BLOOD PRESSURE: 150 MMHG | WEIGHT: 260 LBS | HEIGHT: 67 IN | OXYGEN SATURATION: 97 %

## 2021-11-02 DIAGNOSIS — H66.90 ACUTE OTITIS MEDIA, UNSPECIFIED OTITIS MEDIA TYPE: ICD-10-CM

## 2021-11-02 DIAGNOSIS — J41.0 SIMPLE CHRONIC BRONCHITIS (HCC): ICD-10-CM

## 2021-11-02 DIAGNOSIS — Z72.0 TOBACCO ABUSE: ICD-10-CM

## 2021-11-02 DIAGNOSIS — J01.01 ACUTE RECURRENT MAXILLARY SINUSITIS: Primary | ICD-10-CM

## 2021-11-02 PROCEDURE — 6370000000 HC RX 637 (ALT 250 FOR IP): Performed by: EMERGENCY MEDICINE

## 2021-11-02 PROCEDURE — 99283 EMERGENCY DEPT VISIT LOW MDM: CPT

## 2021-11-02 RX ORDER — BENZONATATE 100 MG/1
200 CAPSULE ORAL ONCE
Status: COMPLETED | OUTPATIENT
Start: 2021-11-02 | End: 2021-11-02

## 2021-11-02 RX ORDER — AMOXICILLIN AND CLAVULANATE POTASSIUM 875; 125 MG/1; MG/1
1 TABLET, FILM COATED ORAL ONCE
Status: COMPLETED | OUTPATIENT
Start: 2021-11-02 | End: 2021-11-02

## 2021-11-02 RX ORDER — AMOXICILLIN AND CLAVULANATE POTASSIUM 875; 125 MG/1; MG/1
1 TABLET, FILM COATED ORAL 2 TIMES DAILY
Qty: 20 TABLET | Refills: 0 | Status: SHIPPED | OUTPATIENT
Start: 2021-11-02 | End: 2021-11-12

## 2021-11-02 RX ORDER — ALBUTEROL SULFATE 90 UG/1
2 AEROSOL, METERED RESPIRATORY (INHALATION) 4 TIMES DAILY PRN
Qty: 18 G | Refills: 0 | Status: SHIPPED | OUTPATIENT
Start: 2021-11-02 | End: 2022-10-04 | Stop reason: SDUPTHER

## 2021-11-02 RX ORDER — GUAIFENESIN AND CODEINE PHOSPHATE 100; 10 MG/5ML; MG/5ML
5 SOLUTION ORAL EVERY 4 HOURS PRN
Qty: 118 ML | Refills: 0 | Status: SHIPPED | OUTPATIENT
Start: 2021-11-02 | End: 2021-11-05

## 2021-11-02 RX ADMIN — BENZONATATE 200 MG: 100 CAPSULE ORAL at 02:39

## 2021-11-02 RX ADMIN — AMOXICILLIN AND CLAVULANATE POTASSIUM 1 TABLET: 875; 125 TABLET, FILM COATED ORAL at 02:39

## 2021-11-02 ASSESSMENT — PAIN DESCRIPTION - ORIENTATION: ORIENTATION: LEFT;RIGHT

## 2021-11-02 ASSESSMENT — PAIN DESCRIPTION - LOCATION
LOCATION: EAR
LOCATION_2: THROAT

## 2021-11-02 ASSESSMENT — PAIN SCALES - GENERAL: PAINLEVEL_OUTOF10: 7

## 2021-11-02 ASSESSMENT — PAIN DESCRIPTION - PAIN TYPE
TYPE_2: ACUTE PAIN
TYPE: ACUTE PAIN

## 2021-11-02 ASSESSMENT — PAIN DESCRIPTION - INTENSITY: RATING_2: 6

## 2021-11-02 ASSESSMENT — PAIN DESCRIPTION - DESCRIPTORS: DESCRIPTORS_2: SORE

## 2021-11-02 NOTE — Clinical Note
Dario Layton was seen and treated in our emergency department on 11/2/2021. She may return to work on 11/04/2021. If you have any questions or concerns, please don't hesitate to call.       Corinthia Fabry, MD

## 2021-11-02 NOTE — ED PROVIDER NOTES
CHIEF COMPLAINT  Cough (started yesterday), Otalgia (bilat ear pain), and Pharyngitis (started yesterday)      HISTORY OF PRESENT ILLNESS  Howard Frank is a 54 y.o. female with a history of type 2 diabetes, hypertension, dyslipidemia, anxiety, tobacco abuse, bronchitis, sinusitis who presents to the ED complaining of multiple complaints. Patient states she has felt unwell for the past 2 days. She has a nonproductive cough, bilateral earache, sore throat, nasal congestion, sinus pressure/fullness. Patient requesting antibiotics, cough medicine, albuterol inhaler. Denies measured fever, chest pain, dyspnea, diaphoresis, syncope, nausea, vomiting, diarrhea, dysuria, rash. No other complaints, modifying factors or associated symptoms. I have reviewed the following from the nursing documentation. Past Medical History:   Diagnosis Date    Anxiety     Hypertension     Ovarian cyst     Type II or unspecified type diabetes mellitus without mention of complication, not stated as uncontrolled      Past Surgical History:   Procedure Laterality Date    ABDOMEN SURGERY      bladder mesh on March 5 2019    APPENDECTOMY      BACK SURGERY      CHOLECYSTECTOMY      DILATION AND CURETTAGE OF UTERUS      ENDOMETRIAL ABLATION      INCONTINENCE SURGERY      MIDDLE EAR SURGERY  10/2002    left ossicular reconstrucion    TUBAL LIGATION       History reviewed. No pertinent family history. Social History     Socioeconomic History    Marital status: Legally      Spouse name: Not on file    Number of children: Not on file    Years of education: Not on file    Highest education level: Not on file   Occupational History    Not on file   Tobacco Use    Smoking status: Current Every Day Smoker     Packs/day: 0.50     Types: Cigarettes    Smokeless tobacco: Never Used    Tobacco comment: 3 cigarettes daily   Vaping Use    Vaping Use: Never assessed   Substance and Sexual Activity    Alcohol use:  Yes Comment: rare    Drug use: Never    Sexual activity: Not on file   Other Topics Concern    Not on file   Social History Narrative    Not on file     Social Determinants of Health     Financial Resource Strain:     Difficulty of Paying Living Expenses:    Food Insecurity:     Worried About Running Out of Food in the Last Year:     [address] in the Last Year:    Transportation Needs:     Lack of Transportation (Medical):  Lack of Transportation (Non-Medical):    Physical Activity:     Days of Exercise per Week:     Minutes of Exercise per Session:    Stress:     Feeling of Stress :    Social Connections:     Frequency of Communication with Friends and Family:     Frequency of Social Gatherings with Friends and Family:     Attends Church Services:     Active Member of Clubs or Organizations:     Attends Club or Organization Meetings:     Marital Status:    Intimate Partner Violence:     Fear of Current or Ex-Partner:     Emotionally Abused:     Physically Abused:     Sexually Abused:      No current facility-administered medications for this encounter. Current Outpatient Medications   Medication Sig Dispense Refill    amoxicillin-clavulanate (AUGMENTIN) 875-125 MG per tablet Take 1 tablet by mouth 2 times daily for 10 days 20 tablet 0    albuterol sulfate HFA (VENTOLIN HFA) 108 (90 Base) MCG/ACT inhaler Inhale 2 puffs into the lungs 4 times daily as needed for Wheezing 18 g 0    guaiFENesin-codeine (CHERATUSSIN AC) 100-10 MG/5ML syrup Take 5 mLs by mouth every 4 hours as needed for Cough for up to 3 days.  118 mL 0    ibuprofen (ADVIL;MOTRIN) 600 MG tablet Take 1 tablet by mouth 3 times daily (with meals) for 7 days 21 tablet 0    acetaminophen (TYLENOL) 500 MG tablet Take 2 tablets by mouth 3 times daily (with meals) 30 tablet 0    citalopram (CELEXA) 20 MG tablet Take 20 mg by mouth daily      fluocinonide (LIDEX) 0.05 % ointment Apply sparingly to affected area(s) up to bid prn 60 g 0    blood glucose test strips (FREESTYLE LITE) strip USE TO TEST BLOOD GLUCOSE LEVELS THREE TIMES A DAY 50 strip 4    insulin lispro, 1 Unit Dial, (HUMALOG KWIKPEN) 100 UNIT/ML SOPN INJECT 18-26 UNITS SUBCUTANEOUSLY THREE TIMES DAILY BEFORE MEALS 10 pen 3    vitamin D (ERGOCALCIFEROL) 1.25 MG (49709 UT) CAPS capsule TAKE ONE CAPSULE BY MOUTH ONCE WEEKLY 4 capsule 3    ADMELOG SOLOSTAR 100 UNIT/ML SOPN INJECT 18-26 UNITS SUBCUTANEOUSLY THREE TIMES DAILY BEFORE MEALS 10 pen 2    insulin glargine (LANTUS SOLOSTAR) 100 UNIT/ML injection pen Inject 80 Units into the skin 2 times daily 10 pen 3    SUMAtriptan (IMITREX) 100 MG tablet Take 1 tablet by mouth once as needed for Migraine (Do not exceed more than 2 tablets in a 24-hour period.) 9 tablet 3    Insulin Pen Needle (KROGER PEN NEEDLES 31G) 31G X 8 MM MISC 1 each by Does not apply route 4 times daily DISPENSE ANY INSURANCE COVERED BRAND 130 each 10    aspirin 81 MG tablet Take 81 mg by mouth daily      rosuvastatin (CRESTOR) 5 MG tablet TAKE ONE TABLET BY MOUTH EVERY OTHER DAY (Patient not taking: Reported on 11/2/2020) 15 tablet 9    Blood Glucose Monitoring Suppl (FREESTYLE LITE) MIRZA Test BS 3 times a day. On insulin Dx Code E11.9 1 Device 0    FREESTYLE LANCETS MISC Test BS 3 times a day.  On insulin Dx Code E11.9 100 each 3    hydrochlorothiazide (HYDRODIURIL) 50 MG tablet Take 50 mg by mouth daily      lisinopril (PRINIVIL;ZESTRIL) 40 MG tablet Take 1 tablet by mouth daily 30 tablet 3     Allergies   Allergen Reactions    Latex Hives    Codeine      Upsets her abd     Contrast [Iodides]     Janumet [Sitagliptin-Metformin Hcl] Other (See Comments)     URI    Metformin And Related Diarrhea    Morphine And Related     Other      Dye   Dye     Dilaudid [Hydromorphone Hcl] Nausea And Vomiting     Severe illness    Hydromorphone Nausea And Vomiting     Severe illness  Nausea     Morphine Nausea And Vomiting and Other (See Comments)     Headache and sweats       REVIEW OF SYSTEMS  10 systems reviewed, pertinent positives per HPI otherwise noted to be negative. PHYSICAL EXAM  BP (!) 150/64   Pulse 101   Temp 99 °F (37.2 °C) (Oral)   Resp 18   Ht 5' 7\" (1.702 m)   Wt 260 lb (117.9 kg)   SpO2 97%   BMI 40.72 kg/m²   GENERAL APPEARANCE: Awake and alert. Cooperative. Appears to be feeling unwell but overall nontoxic. Obese. HEAD: Normocephalic. Atraumatic. EYES: PERRL. EOM's grossly intact. ENT: Mucous membranes are moist.  No erythema or exudates in the posterior oropharynx. Mild to moderate frontal and maxillary sinus tenderness. Nasal congestion. Bulging of the left tympanic membrane with erythema and cream-colored effusion. NECK: Supple, trachea midline. HEART: RRR. Normal S1, S2. No murmurs, rubs or gallops. LUNGS: Respirations unlabored. Moderate air movement. Occasional nonproductive cough observed. No wheezing, rales, rhonchi. Speaking comfortably in full sentences. ABDOMEN: Soft. Non-distended. Non-tender. No guarding or rebound. Normal Bowel sounds. EXTREMITIES: No peripheral edema. MAEE. No acute deformities. SKIN: Warm and dry. No acute rashes. NEUROLOGICAL: Alert and oriented X 3. CN II-XII intact. No gross facial drooping. Strength 5/5, sensation intact. No pronator drift. Normal coordination. PSYCHIATRIC: Normal mood and affect. LABS  I have reviewed all labs for this visit. No results found for this visit on 11/02/21. RADIOLOGY  X-RAYS:  I have reviewed radiologic plain film image(s). ALL OTHER NON-PLAIN FILM IMAGES SUCH AS CT, ULTRASOUND AND MRI HAVE BEEN READ BY THE RADIOLOGIST. No orders to display              ED COURSE/MDM  Patient seen and evaluated. Old records reviewed. Labs and imaging reviewed and results discussed with patient. Patient with recurrence of sinusitis and chronic bronchitis. Continues to smoke.   Appears to have left-sided otitis media on physical exam.  Plan for antibiotics, close PCP follow-up versus return with any concerns. Patient was given scripts for the following medications. I counseled patient how to take these medications. Discharge Medication List as of 11/2/2021  2:52 AM      START taking these medications    Details   amoxicillin-clavulanate (AUGMENTIN) 875-125 MG per tablet Take 1 tablet by mouth 2 times daily for 10 days, Disp-20 tablet, R-0Print      albuterol sulfate HFA (VENTOLIN HFA) 108 (90 Base) MCG/ACT inhaler Inhale 2 puffs into the lungs 4 times daily as needed for Wheezing, Disp-18 g, R-0Print      guaiFENesin-codeine (CHERATUSSIN AC) 100-10 MG/5ML syrup Take 5 mLs by mouth every 4 hours as needed for Cough for up to 3 days. , Disp-118 mL, R-0Print             CLINICAL IMPRESSION  1. Acute recurrent maxillary sinusitis    2. Acute otitis media, unspecified otitis media type    3. Tobacco abuse    4. Simple chronic bronchitis (HCC)        Blood pressure (!) 150/64, pulse 101, temperature 99 °F (37.2 °C), temperature source Oral, resp. rate 18, height 5' 7\" (1.702 m), weight 260 lb (117.9 kg), SpO2 97 %, not currently breastfeeding. [address] discharged to home in stable condition.         Gamal Padilla MD  11/02/21 2274

## 2022-07-25 LAB
AVERAGE GLUCOSE: 189
HBA1C MFR BLD: 8.2 %

## 2022-08-15 ENCOUNTER — TELEPHONE (OUTPATIENT)
Dept: FAMILY MEDICINE CLINIC | Age: 52
End: 2022-08-15

## 2022-08-15 NOTE — TELEPHONE ENCOUNTER
Received call from Pt stating that wife's PCP is leaving and would like to establish with [facility]. Name is Marely Mc, [place] 1970               DH    12:35 PM  Leny Brown contacted Héctor Scruggs             CL    12:36 PM  Héctor Scruggs routed this conversation to [address]  to MD JUSTIN Dill    12:42 PM  Note   Ok to est?           Jade Dumont MD  to [address]    4:13 PM  yes       [place]     CL    5:10 PM  Note   LM to [facility] NP for spouse appt.  Ok per Jennifer Figueroa

## 2022-09-22 LAB — PAP SMEAR, EXTERNAL: NORMAL

## 2022-10-04 ENCOUNTER — OFFICE VISIT (OUTPATIENT)
Dept: FAMILY MEDICINE CLINIC | Age: 52
End: 2022-10-04
Payer: MEDICARE

## 2022-10-04 VITALS
DIASTOLIC BLOOD PRESSURE: 74 MMHG | HEART RATE: 80 BPM | WEIGHT: 271 LBS | SYSTOLIC BLOOD PRESSURE: 122 MMHG | OXYGEN SATURATION: 94 % | HEIGHT: 64 IN | BODY MASS INDEX: 46.26 KG/M2

## 2022-10-04 DIAGNOSIS — E66.01 MORBID OBESITY (HCC): ICD-10-CM

## 2022-10-04 DIAGNOSIS — F41.1 GAD (GENERALIZED ANXIETY DISORDER): ICD-10-CM

## 2022-10-04 DIAGNOSIS — Z23 NEED FOR PROPHYLACTIC VACCINATION AND INOCULATION AGAINST INFLUENZA: Primary | ICD-10-CM

## 2022-10-04 DIAGNOSIS — E11.42 TYPE 2 DIABETES MELLITUS WITH DIABETIC POLYNEUROPATHY, WITHOUT LONG-TERM CURRENT USE OF INSULIN (HCC): ICD-10-CM

## 2022-10-04 DIAGNOSIS — J41.0 SMOKERS' COUGH (HCC): ICD-10-CM

## 2022-10-04 DIAGNOSIS — I10 HYPERTENSION, UNSPECIFIED TYPE: ICD-10-CM

## 2022-10-04 PROCEDURE — G0008 ADMIN INFLUENZA VIRUS VAC: HCPCS | Performed by: FAMILY MEDICINE

## 2022-10-04 PROCEDURE — 90674 CCIIV4 VAC NO PRSV 0.5 ML IM: CPT | Performed by: FAMILY MEDICINE

## 2022-10-04 PROCEDURE — 99204 OFFICE O/P NEW MOD 45 MIN: CPT | Performed by: FAMILY MEDICINE

## 2022-10-04 RX ORDER — CITALOPRAM 40 MG/1
40 TABLET ORAL DAILY
Qty: 30 TABLET | Refills: 5 | Status: SHIPPED | OUTPATIENT
Start: 2022-10-04

## 2022-10-04 RX ORDER — DULAGLUTIDE 0.75 MG/.5ML
0.75 INJECTION, SOLUTION SUBCUTANEOUS WEEKLY
Qty: 4 ADJUSTABLE DOSE PRE-FILLED PEN SYRINGE | Refills: 0
Start: 2022-10-04

## 2022-10-04 RX ORDER — ALBUTEROL SULFATE 90 UG/1
2 AEROSOL, METERED RESPIRATORY (INHALATION) 4 TIMES DAILY PRN
Qty: 18 G | Refills: 0 | Status: SHIPPED | OUTPATIENT
Start: 2022-10-04

## 2022-10-04 ASSESSMENT — PATIENT HEALTH QUESTIONNAIRE - PHQ9
SUM OF ALL RESPONSES TO PHQ QUESTIONS 1-9: 1
SUM OF ALL RESPONSES TO PHQ QUESTIONS 1-9: 1
1. LITTLE INTEREST OR PLEASURE IN DOING THINGS: 1
SUM OF ALL RESPONSES TO PHQ9 QUESTIONS 1 & 2: 1
SUM OF ALL RESPONSES TO PHQ QUESTIONS 1-9: 1
SUM OF ALL RESPONSES TO PHQ QUESTIONS 1-9: 1
2. FEELING DOWN, DEPRESSED OR HOPELESS: 0

## 2022-10-11 ENCOUNTER — TELEMEDICINE (OUTPATIENT)
Dept: FAMILY MEDICINE CLINIC | Age: 52
End: 2022-10-11
Payer: MEDICARE

## 2022-10-11 DIAGNOSIS — B96.89 ACUTE BACTERIAL SINUSITIS: Primary | ICD-10-CM

## 2022-10-11 DIAGNOSIS — J01.90 ACUTE BACTERIAL SINUSITIS: Primary | ICD-10-CM

## 2022-10-11 PROCEDURE — 99213 OFFICE O/P EST LOW 20 MIN: CPT | Performed by: NURSE PRACTITIONER

## 2022-10-11 RX ORDER — DEXTROMETHORPHAN HYDROBROMIDE AND PROMETHAZINE HYDROCHLORIDE 15; 6.25 MG/5ML; MG/5ML
5 SYRUP ORAL 4 TIMES DAILY PRN
Qty: 180 ML | Refills: 0 | Status: SHIPPED | OUTPATIENT
Start: 2022-10-11

## 2022-10-11 RX ORDER — AMOXICILLIN AND CLAVULANATE POTASSIUM 875; 125 MG/1; MG/1
1 TABLET, FILM COATED ORAL 2 TIMES DAILY
Qty: 20 TABLET | Refills: 0 | Status: SHIPPED | OUTPATIENT
Start: 2022-10-11 | End: 2022-10-21

## 2022-10-11 ASSESSMENT — ENCOUNTER SYMPTOMS
SORE THROAT: 1
SINUS PRESSURE: 1
COUGH: 1
SHORTNESS OF BREATH: 0
WHEEZING: 0
GASTROINTESTINAL NEGATIVE: 1
SINUS PAIN: 1

## 2022-10-11 NOTE — PROGRESS NOTES
10/11/2022    TELEHEALTH EVALUATION -- Audio/Visual (During AIVXA-03 public health emergency)    HPI:    Fito Andujar (:  1970) has requested an audio/video evaluation for the following concern(s):    Patient presents today  with concerns of a cough and congestion going on for the past 7-10 days. She feels a lot of pressure and feels puffy under her eyes. She has been taking OTC Ibuprofen. She states symptoms seem to be worsening. Review of Systems   Constitutional:  Positive for fatigue. HENT:  Positive for congestion, sinus pressure, sinus pain and sore throat. Respiratory:  Positive for cough. Negative for shortness of breath and wheezing. Gastrointestinal: Negative. Musculoskeletal: Negative. Neurological: Negative. Psychiatric/Behavioral: Negative. Prior to Visit Medications    Medication Sig Taking?  Authorizing Provider   amoxicillin-clavulanate (AUGMENTIN) 875-125 MG per tablet Take 1 tablet by mouth 2 times daily for 10 days Yes ROGELIO Keen CNP   promethazine-dextromethorphan (PROMETHAZINE-DM) 6.25-15 MG/5ML syrup Take 5 mLs by mouth 4 times daily as needed for Cough Yes ROGELIO Keen CNP   citalopram (CELEXA) 40 MG tablet Take 1 tablet by mouth daily Yes Yimi Templeton MD   albuterol sulfate HFA (VENTOLIN HFA) 108 (90 Base) MCG/ACT inhaler Inhale 2 puffs into the lungs 4 times daily as needed for Wheezing Yes Yimi Templeton MD   Dulaglutide (TRULICITY) 6.72 YV/3.8UV SOPN Inject 0.75 mg into the skin once a week Yes Yimi Templeton MD   citalopram (CELEXA) 20 MG tablet Take 40 mg by mouth daily Yes Historical Provider, MD   fluocinonide (LIDEX) 0.05 % ointment Apply sparingly to affected area(s) up to bid prn Yes Bradley Suarez MD   blood glucose test strips (FREESTYLE LITE) strip USE TO TEST BLOOD GLUCOSE LEVELS THREE TIMES A DAY Yes Leopoldo Coward, MD   vitamin D (ERGOCALCIFEROL) 1.25 MG (89553 UT) CAPS capsule TAKE ONE CAPSULE BY MOUTH ONCE WEEKLY Yes Annika Bunch APRN - CNP   ADMELOG SOLOSTAR 100 UNIT/ML SOPN INJECT 18-26 UNITS SUBCUTANEOUSLY THREE TIMES DAILY BEFORE MEALS Yes Sebastian Taylor MD   insulin glargine (LANTUS SOLOSTAR) 100 UNIT/ML injection pen Inject 80 Units into the skin 2 times daily  Patient taking differently: Inject 50 Units into the skin 2 times daily Yes Ruchi Arshad MD   Insulin Pen Needle ([employer]ER PEN NEEDLES 31G) 31G X 8 MM MISC 1 each by Does not apply route 4 times daily DISPENSE José Miguel Ruthalexis Yes Ruchi Arshad MD   aspirin 81 MG tablet Take 81 mg by mouth daily Yes Historical Provider, MD   Blood Glucose Monitoring Suppl (FREESTYLE LITE) MIRZA Test BS 3 times a day. On insulin Dx Code E11.9 Yes Ruchi Arshad MD   FREESTYLE LANCETS MISC Test BS 3 times a day.  On insulin Dx Code E11.9 Yes Ruchi Arshad MD   hydrochlorothiazide (HYDRODIURIL) 50 MG tablet Take 50 mg by mouth daily Yes Historical Provider, MD   lisinopril (PRINIVIL;ZESTRIL) 40 MG tablet Take 1 tablet by mouth daily Yes Ruchi Arshad MD   ibuprofen (ADVIL;MOTRIN) 600 MG tablet Take 1 tablet by mouth 3 times daily (with meals) for 7 days  Kimmie Cain PA-C       Social History     Tobacco Use    Smoking status: Every Day     Packs/day: 0.50     Types: Cigarettes    Smokeless tobacco: Never    Tobacco comments:     3 cigarettes daily   Substance Use Topics    Alcohol use: Yes     Comment: rare    Drug use: Never            PHYSICAL EXAMINATION:  [ INSTRUCTIONS:  \"[x]\" Indicates a positive item  \"[]\" Indicates a negative item  -- DELETE ALL ITEMS NOT EXAMINED]  Vital Signs: (As obtained by patient/caregiver or practitioner observation)    Blood pressure-  Heart rate-    Respiratory rate-    Temperature-  Pulse oximetry-     Constitutional: [x] Appears well-developed and well-nourished [x] No apparent distress      [] Abnormal-   Mental status  [x] Alert and awake  [] Oriented to person/place/time []Able to follow commands Eyes:  EOM    []  Normal  [] Abnormal-  Sclera  []  Normal  [] Abnormal -         Discharge []  None visible  [] Abnormal -    HENT:   [x] Normocephalic, atraumatic. [] Abnormal   [x] Mouth/Throat: Mucous membranes are moist.     External Ears [] Normal  [] Abnormal-     Neck: [] No visualized mass     Pulmonary/Chest: [x] Respiratory effort normal.  [] No visualized signs of difficulty breathing or respiratory distress        [] Abnormal-      Musculoskeletal:   [x] Normal gait with no signs of ataxia         [] Normal range of motion of neck        [] Abnormal-       Neurological:        [] No Facial Asymmetry (Cranial nerve 7 motor function) (limited exam to video visit)          [] No gaze palsy        [] Abnormal-         Skin:        [x] No significant exanthematous lesions or discoloration noted on facial skin         [] Abnormal-            Psychiatric:       [x] Normal Affect [x] No Hallucinations        [] Abnormal-     Other pertinent observable physical exam findings-     ASSESSMENT/PLAN:  1. Acute bacterial sinusitis  Treat with below medications and follow up if no improvement. - amoxicillin-clavulanate (AUGMENTIN) 875-125 MG per tablet; Take 1 tablet by mouth 2 times daily for 10 days  Dispense: 20 tablet; Refill: 0  - promethazine-dextromethorphan (PROMETHAZINE-DM) 6.25-15 MG/5ML syrup; Take 5 mLs by mouth 4 times daily as needed for Cough  Dispense: 180 mL; Refill: 0        Santiagofrancesca Paula is a 50 y.o. female being evaluated by a Virtual Visit (video visit) encounter to address concerns as mentioned above. A caregiver was present when appropriate. Due to this being a TeleHealth encounter (During [place] [address] public health emergency), evaluation of the following organ systems was limited: Vitals/Constitutional/EENT/Resp/CV/GI//MS/Neuro/Skin/Heme-Lymph-Imm.   Pursuant to the emergency declaration under the [facility]5 waiver authority and the Coronavirus Preparedness and Response Supplemental Appropriations Act, this Virtual Visit was conducted with patient's (and/or legal guardian's) consent, to reduce the patient's risk of exposure to COVID-19 and provide necessary medical care. The patient (and/or legal guardian) has also been advised to contact this office for worsening conditions or problems, and seek emergency medical treatment and/or call 911 if deemed necessary. Services were provided through a video synchronous discussion virtually to substitute for in-person clinic visit. Patient and provider were located at their individual homes. --ROGELIO Donis CNP on 10/11/2022 at 1:58 PM    This chart was generated using the [address] [place] [address] [address]  dictation system. I created this record but it may contain dictation errors due to the limitation of the software. An electronic signature was used to authenticate this note.

## 2022-10-16 ASSESSMENT — ENCOUNTER SYMPTOMS
COLOR CHANGE: 0
CONSTIPATION: 0
COUGH: 1
BACK PAIN: 0
RHINORRHEA: 0
CHEST TIGHTNESS: 0
SHORTNESS OF BREATH: 0
EYE PAIN: 0
TROUBLE SWALLOWING: 0
DIARRHEA: 0

## 2022-10-16 NOTE — PROGRESS NOTES
Rossi Horne (:  1970) is a 50 y.o. female,Established patient, here for evaluation of the following chief complaint(s):  Establish Care         ASSESSMENT/PLAN:  1. Need for prophylactic vaccination and inoculation against influenza  -     Influenza, FLUCELVAX, (age 10 mo+), IM, Preservative Free, 0.5 mL  2. Type 2 diabetes mellitus with diabetic polyneuropathy, without long-term current use of insulin (HCC)  -     Dulaglutide (TRULICITY) 1.78 UN/2.4AP SOPN; Inject 0.75 mg into the skin once a week, Disp-4 Adjustable Dose Pre-filled Pen Syringe, R-0NO PRINT  Risks and side effects reviewed with the patient today. 3. Morbid obesity ([address]  4. DARREN (generalized anxiety disorder)  -     citalopram (CELEXA) 40 MG tablet; Take 1 tablet by mouth daily, Disp-30 tablet, R-5Normal  Increased dose of Celexa. 5. Hypertension, unspecified type  Well-controlled continue current medication. 6. Smokers' cough (HCC)  -     albuterol sulfate HFA (VENTOLIN HFA) 108 (90 Base) MCG/ACT inhaler; Inhale 2 puffs into the lungs 4 times daily as needed for Wheezing, Disp-18 g, R-0Normal      No follow-ups on file. Subjective   SUBJECTIVE/OBJECTIVE:  Rossi Horne is a 50 y.o. female. Patient presents with:  Establish Care      This is a 24-year-old female who presents to [facility]. She has the following concerns:    1. Diabetes mellitus with morbid obesity. Patient has been having a difficult time controlling her blood sugars. She notes that she has also had a difficult time losing weight. She has not had a GLP-1 in the past.  Patient notes that she would like to start to try to lose weight. She feels that she has generally otherwise trying to follow good diet and take her meds otherwise. She denies any other significant concerns or problems at this time. Does have a history of polyneuropathy. No significant visual issues. 2.  Patient has a history of depression and anxiety. She has been on Celexa for this. Has not been as effective as it once was. Patient notes that anxiety is worsening over time. Notes that she is feeling that the Celexa did work well in the past and she has been stable on a 20 mg dose for some time. 3.  Hypertension patient has been taking medicines and this been well controlled. Denies any significant chest pain shortness of breath or headaches. 4.  Patient has a consistent wheezy smoker's cough. She continues to smoke cigarettes. Is not ready to quit at this time. Has noted that albuterol has helped this in the past.  The patients PMH, surgical history, family history, medications, allergies were all reviewed and updated as appropriate today. Review of Systems   Constitutional:  Negative for fatigue and unexpected weight change. HENT:  Negative for congestion, rhinorrhea and trouble swallowing. Eyes:  Negative for pain and visual disturbance. Respiratory:  Positive for cough. Negative for chest tightness and shortness of breath. Cardiovascular:  Negative for chest pain and palpitations. Gastrointestinal:  Negative for constipation and diarrhea. Endocrine: Negative for cold intolerance and heat intolerance. Genitourinary:  Negative for frequency and urgency. Musculoskeletal:  Negative for arthralgias and back pain. Skin:  Negative for color change and rash. Allergic/Immunologic: Negative for environmental allergies and food allergies. Neurological:  Negative for dizziness and light-headedness. Hematological:  Negative for adenopathy. Does not bruise/bleed easily. Psychiatric/Behavioral:  Negative for dysphoric mood and sleep disturbance. Objective   Physical Exam  Vitals and nursing note reviewed. Constitutional:       Appearance: Normal appearance. She is well-developed. HENT:      Head: Normocephalic and atraumatic.       Right Ear: External ear normal.      Left Ear: External ear normal.      Nose: Nose normal.   Eyes: Conjunctiva/sclera: Conjunctivae normal.      Pupils: Pupils are equal, round, and reactive to light. Cardiovascular:      Rate and Rhythm: Normal rate and regular rhythm. Heart sounds: Normal heart sounds. Pulmonary:      Effort: Pulmonary effort is normal.      Breath sounds: Normal breath sounds. Abdominal:      General: Bowel sounds are normal.      Palpations: Abdomen is soft. Musculoskeletal:         General: Normal range of motion. Cervical back: Normal range of motion and neck supple. Skin:     General: Skin is dry. Neurological:      Mental Status: She is alert and oriented to person, place, and time. Deep Tendon Reflexes: Reflexes are normal and symmetric. Psychiatric:         Behavior: Behavior normal.         Thought Content: Thought content normal.         Judgment: Judgment normal.            An electronic signature was used to authenticate this note.     --Sandra Case MD

## 2022-11-16 ENCOUNTER — NURSE TRIAGE (OUTPATIENT)
Dept: OTHER | Facility: CLINIC | Age: 52
End: 2022-11-16

## 2022-11-16 NOTE — TELEPHONE ENCOUNTER
Location of patient: [address] call from [place] at [employer] with mobli. Subjective: Caller states \"I have pain in my chest that can either be on the R or L side and then it will go down the middle of my chest and then backwards to my back. When I burp, it is really nasty tasting. Sometimes I feel like there is something stuck in my throat. I can then get a really sharp pain in my L side near my ribs. Last week, I awakened from sleep and vomited. Today I had green diarrhea. \"     Onset:  10 days    Pain Severity:   when pain is really bad, it can be a 6 or 7. Temperature:      What has been tried: tried an OTC Prilosec and it didn't help      Recommended disposition: See PCP within 24 Hours    Care advice provided, patient verbalizes understanding; denies any other questions or concerns; instructed to call back for any new or worsening symptoms. Seymour Avilez, is working to get an appt for pt. Attention Provider: Thank you for allowing me to participate in the care of your patient. The patient was connected to triage in response to information provided to the ECC/PSC. Please do not respond through this encounter as the response is not directed to a shared pool.     Reason for Disposition   [1] MODERATE pain (e.g., interferes with normal activities) AND [2] comes and goes (cramps) AND [3] present > 24 hours  (Exception: pain with Vomiting or Diarrhea - see that Guideline)    Protocols used: Abdominal Pain - Upper-ADULT-

## 2022-11-17 ENCOUNTER — OFFICE VISIT (OUTPATIENT)
Dept: FAMILY MEDICINE CLINIC | Age: 52
End: 2022-11-17
Payer: MEDICARE

## 2022-11-17 VITALS
SYSTOLIC BLOOD PRESSURE: 134 MMHG | WEIGHT: 275 LBS | DIASTOLIC BLOOD PRESSURE: 80 MMHG | OXYGEN SATURATION: 97 % | HEIGHT: 64 IN | HEART RATE: 79 BPM | BODY MASS INDEX: 46.95 KG/M2

## 2022-11-17 DIAGNOSIS — K21.9 GASTROESOPHAGEAL REFLUX DISEASE WITHOUT ESOPHAGITIS: ICD-10-CM

## 2022-11-17 DIAGNOSIS — E11.42 TYPE 2 DIABETES MELLITUS WITH DIABETIC POLYNEUROPATHY, WITHOUT LONG-TERM CURRENT USE OF INSULIN (HCC): ICD-10-CM

## 2022-11-17 DIAGNOSIS — R07.9 CHEST PAIN, UNSPECIFIED TYPE: Primary | ICD-10-CM

## 2022-11-17 PROCEDURE — 99214 OFFICE O/P EST MOD 30 MIN: CPT | Performed by: FAMILY MEDICINE

## 2022-11-17 PROCEDURE — 3078F DIAST BP <80 MM HG: CPT | Performed by: FAMILY MEDICINE

## 2022-11-17 PROCEDURE — 3074F SYST BP LT 130 MM HG: CPT | Performed by: FAMILY MEDICINE

## 2022-11-17 PROCEDURE — 93000 ELECTROCARDIOGRAM COMPLETE: CPT | Performed by: FAMILY MEDICINE

## 2022-11-17 RX ORDER — FAMOTIDINE 40 MG/1
40 TABLET, FILM COATED ORAL EVERY EVENING
Qty: 30 TABLET | Refills: 0 | Status: SHIPPED | OUTPATIENT
Start: 2022-11-17

## 2022-11-17 RX ORDER — DULAGLUTIDE 0.75 MG/.5ML
0.75 INJECTION, SOLUTION SUBCUTANEOUS WEEKLY
Qty: 4 ADJUSTABLE DOSE PRE-FILLED PEN SYRINGE | Refills: 0 | Status: SHIPPED | OUTPATIENT
Start: 2022-11-17

## 2022-11-17 NOTE — PROGRESS NOTES
Gurpreet Piña (:  1970) is a 50 y.o. female,Established patient possible as, here for evaluation of the following chief complaint(s):  Chest Pain         ASSESSMENT/PLAN:  1. Chest pain, unspecified type  -     EKG 12 Lead - Clinic Performed  2. Type 2 diabetes mellitus with diabetic polyneuropathy, without long-term current use of insulin (HCC)  -     Dulaglutide (TRULICITY) 6.00 RL/7.8EW SOPN; Inject 0.75 mg into the skin once a week, Disp-4 Adjustable Dose Pre-filled Pen Syringe, R-0Normal  3. Gastroesophageal reflux disease without esophagitis  -     famotidine (PEPCID) 40 MG tablet; Take 1 tablet by mouth every evening, Disp-30 tablet, R-0Normal  6 did not believe the chest pain is related to cardiac issues. Think it is related to GERD. We will try Pepcid 40 mg once daily. Diabetes to be treated as above. No follow-ups on file. Subjective   SUBJECTIVE/OBJECTIVE:  Gurpreet Piña is a 50 y.o. female. Patient presents with:  Chest Pain      Chest pain for 1 and 1/2 weeks left and right breast and into middle. Hurts all the time. Not worse with breathing. Worse when she does a lot during the day. Nothing makes better. Achy pain across just, midline pain is sharp. Having heartburn more the usual.  Did throw up twice, and had diarrhea that was green yesterday. A little smoker cough    Patient also has type 2 diabetes. Has trouble losing weight. Would like to start a medication to help him with weight loss and better diabetic control. Hemoglobin A1C       Date                     Value               Ref Range           Status                10/11/2019               12.0                %                   Final            ----------    The patients PMH, surgical history, family history, medications, allergies were all reviewed and updated as appropriate today. Chest Pain       Review of Systems   Cardiovascular:  Positive for chest pain.         Objective   Physical Exam  Vitals and nursing note reviewed. Constitutional:       Appearance: Normal appearance. She is well-developed. HENT:      Head: Normocephalic and atraumatic. Right Ear: External ear normal.      Left Ear: External ear normal.      Nose: Nose normal.   Eyes:      Conjunctiva/sclera: Conjunctivae normal.      Pupils: Pupils are equal, round, and reactive to light. Cardiovascular:      Rate and Rhythm: Normal rate and regular rhythm. Heart sounds: Normal heart sounds. Pulmonary:      Effort: Pulmonary effort is normal.      Breath sounds: Normal breath sounds. Abdominal:      General: Bowel sounds are normal.      Palpations: Abdomen is soft. Musculoskeletal:         General: Normal range of motion. Cervical back: Normal range of motion and neck supple. Skin:     General: Skin is dry. Neurological:      Mental Status: She is alert and oriented to person, place, and time. Deep Tendon Reflexes: Reflexes are normal and symmetric. Psychiatric:         Behavior: Behavior normal.         Thought Content: Thought content normal.         Judgment: Judgment normal.            An electronic signature was used to authenticate this note.     --Miracle Barragan MD

## 2022-12-14 RX ORDER — LISINOPRIL 40 MG/1
40 TABLET ORAL DAILY
Qty: 30 TABLET | Refills: 3 | Status: SHIPPED | OUTPATIENT
Start: 2022-12-14

## 2022-12-14 RX ORDER — IBUPROFEN 600 MG/1
600 TABLET ORAL
Qty: 21 TABLET | Refills: 0 | Status: SHIPPED | OUTPATIENT
Start: 2022-12-14 | End: 2022-12-21

## 2022-12-19 ENCOUNTER — PATIENT MESSAGE (OUTPATIENT)
Dept: FAMILY MEDICINE CLINIC | Age: 52
End: 2022-12-19

## 2022-12-19 DIAGNOSIS — F41.1 GAD (GENERALIZED ANXIETY DISORDER): ICD-10-CM

## 2022-12-19 RX ORDER — CITALOPRAM 40 MG/1
40 TABLET ORAL DAILY
Qty: 90 TABLET | Refills: 1 | Status: SHIPPED | OUTPATIENT
Start: 2022-12-19

## 2022-12-19 NOTE — TELEPHONE ENCOUNTER
From: Lazaro Boss  To: Dr. Sanchez Haver: 12/19/2022 10:08 AM EST  Subject: My medicine     Hi this is Syed Botello my home delivery pharmacy has been reaching out to you for my lisinalpril and my citalopram so they can fill them and send them to me can you check on that for me thank you

## 2022-12-19 NOTE — TELEPHONE ENCOUNTER
Last Office Visit  -  11/17/22  Next Office Visit  -  4/4/23    Last Filled  -    Last UDS -    Contract -

## 2022-12-28 ENCOUNTER — TELEMEDICINE (OUTPATIENT)
Dept: FAMILY MEDICINE CLINIC | Age: 52
End: 2022-12-28
Payer: MEDICARE

## 2022-12-28 DIAGNOSIS — J34.89 SINUS PRESSURE: ICD-10-CM

## 2022-12-28 DIAGNOSIS — R52 BODY ACHES: ICD-10-CM

## 2022-12-28 DIAGNOSIS — R05.1 ACUTE COUGH: Primary | ICD-10-CM

## 2022-12-28 LAB
INFLUENZA A ANTIBODY: NEGATIVE
INFLUENZA B ANTIBODY: NEGATIVE
KIT LOT NO., HCLOLOT: NORMAL
SARS-COV-2, POC: NORMAL
VALID INTERNAL CONTROL, POC: NORMAL
VENDOR AND KIT NAME POC: NORMAL

## 2022-12-28 PROCEDURE — 99213 OFFICE O/P EST LOW 20 MIN: CPT | Performed by: REGISTERED NURSE

## 2022-12-28 PROCEDURE — 87804 INFLUENZA ASSAY W/OPTIC: CPT | Performed by: REGISTERED NURSE

## 2022-12-28 ASSESSMENT — ENCOUNTER SYMPTOMS
SINUS PRESSURE: 1
NAUSEA: 1
SINUS PAIN: 1
SHORTNESS OF BREATH: 1
RHINORRHEA: 0
WHEEZING: 0
VOMITING: 1
COUGH: 1
SORE THROAT: 1

## 2022-12-28 NOTE — PROGRESS NOTES
2022    TELEHEALTH EVALUATION -- Audio/Visual (During [id number]-48 public health emergency)    HPI:    Anne Cranker (:  1970) has requested an audio/video evaluation for the following concern(s):    She is here for a virtual visit. She has pressure in her face, a headache and a cough. She has chills. She has not taken her temperature. Her symptoms stated 3 days ago. She has taken some sinus medication but this has not helped much. Review of Systems   Constitutional:  Positive for chills, diaphoresis and fatigue. Negative for fever. HENT:  Positive for congestion, postnasal drip, sinus pressure, sinus pain and sore throat. Negative for rhinorrhea and sneezing. Respiratory:  Positive for cough and shortness of breath (with coughing). Negative for wheezing. Cardiovascular: Negative. Gastrointestinal:  Positive for nausea and vomiting. Genitourinary: Negative. Musculoskeletal:  Positive for myalgias. Neurological:  Positive for dizziness (after coughing). Prior to Visit Medications    Medication Sig Taking?  Authorizing Provider   citalopram (CELEXA) 40 MG tablet Take 1 tablet by mouth daily Yes Eve Santiago MD   ibuprofen (ADVIL;MOTRIN) 600 MG tablet Take 1 tablet by mouth 3 times daily (with meals) for 7 days Yes Eve Santiago MD   lisinopril (PRINIVIL;ZESTRIL) 40 MG tablet Take 1 tablet by mouth daily Yes Eve Santiago MD   Dulaglutide (TRULICITY) 2.90 ED/5.4UP SOPN Inject 0.75 mg into the skin once a week Yes Eve Santiago MD   famotidine (PEPCID) 40 MG tablet Take 1 tablet by mouth every evening Yes Eve Santiago MD   albuterol sulfate HFA (VENTOLIN HFA) 108 (90 Base) MCG/ACT inhaler Inhale 2 puffs into the lungs 4 times daily as needed for Wheezing Yes Eve Santiago MD   fluocinonide (LIDEX) 0.05 % ointment Apply sparingly to affected area(s) up to bid prn Yes Jennifer Montalvo MD   blood glucose test strips (FREESTYLE LITE) strip USE TO TEST BLOOD GLUCOSE LEVELS THREE TIMES A DAY Yes Desiree Kathleen MD   vitamin D (ERGOCALCIFEROL) 1.25 MG (56136 UT) CAPS capsule TAKE ONE CAPSULE BY MOUTH ONCE WEEKLY Yes Rose Marie Valentine, APRN - CNP   ADMELOG SOLOSTAR 100 UNIT/ML SOPN INJECT 18-26 UNITS SUBCUTANEOUSLY THREE TIMES DAILY BEFORE MEALS Yes Rain Luna MD   insulin glargine (LANTUS SOLOSTAR) 100 UNIT/ML injection pen Inject 80 Units into the skin 2 times daily  Patient taking differently: Inject 50 Units into the skin 2 times daily Yes Desiree Kathleen MD   Insulin Pen Needle ([employer] PEN NEEDLES 31G) 31G X 8 MM MISC 1 each by Does not apply route 4 times daily DISPENSE Mennie Wibaux Yes Desiree Kathleen MD   aspirin 81 MG tablet Take 81 mg by mouth daily Yes Historical Provider, MD   Blood Glucose Monitoring Suppl (FREESTYLE LITE) MIRZA Test BS 3 times a day. On insulin Dx Code E11.9 Yes Desiree Kathleen MD   FREESTYLE LANCETS MISC Test BS 3 times a day.  On insulin Dx Code E11.9 Yes Desiree Kathleen MD   hydrochlorothiazide (HYDRODIURIL) 50 MG tablet Take 50 mg by mouth daily Yes Historical Provider, MD       Social History     Tobacco Use    Smoking status: Every Day     Packs/day: 0.50     Types: Cigarettes    Smokeless tobacco: Never    Tobacco comments:     3 cigarettes daily   Substance Use Topics    Alcohol use: Yes     Comment: rare    Drug use: Never        Allergies   Allergen Reactions    Latex Hives    Hydromorphone Nausea And Vomiting     Severe illness  Nausea     Iodides     Codeine      Upsets her abd     Janumet [Sitagliptin-Metformin Hcl] Other (See Comments)     URI    Metformin And Related Diarrhea    Morphine And Related     Other      Dye   Dye     Dilaudid [Hydromorphone Hcl] Nausea And Vomiting     Severe illness    Morphine Nausea And Vomiting and Other (See Comments)     Headache and sweats   ,   Past Medical History:   Diagnosis Date    Anxiety     Bone mass     left tibia, benign    Hypertension     Ovarian cyst     Psoriasis     Type II or unspecified type diabetes mellitus without mention of complication, not stated as uncontrolled        PHYSICAL EXAMINATION:  [ INSTRUCTIONS:  \"[x]\" Indicates a positive item  \"[]\" Indicates a negative item  -- DELETE ALL ITEMS NOT EXAMINED]    Constitutional: [x] Appears well-developed and well-nourished [x] No apparent distress      [] Abnormal-   Mental status  [x] Alert and awake  [x] Oriented to person/place/time [x]Able to follow commands      Eyes:  EOM    [x]  Normal  [] Abnormal-  Sclera  [x]  Normal  [] Abnormal -         Discharge [x]  None visible  [] Abnormal -    HENT:   [x] Normocephalic, atraumatic. [] Abnormal   [x] Mouth/Throat: Mucous membranes are moist.     External Ears [] Normal  [] Abnormal-     Neck: [x] No visualized mass     Pulmonary/Chest: [x] Respiratory effort normal.  [x] No visualized signs of difficulty breathing or respiratory distress        [x] Abnormal- dry, hacking cough     Musculoskeletal:   [] Normal gait with no signs of ataxia         [x] Normal range of motion of neck        [] Abnormal-       Neurological:        [x] No Facial Asymmetry (Cranial nerve 7 motor function) (limited exam to video visit)          [x] No gaze palsy        [] Abnormal-         Skin:        [x] No significant exanthematous lesions or discoloration noted on facial skin         [] Abnormal-            Psychiatric:       [x] Normal Affect [x] No Hallucinations        [] Abnormal-     Other pertinent observable physical exam findings- none    ASSESSMENT/PLAN:    Symptoms are consistent with viral etiology. Recommend COVID and influenza testing. She was instructed to come to the office for tests. Recommend symptom management-detailed instructions included in AVS and patient confirms access to MyChart. Recommend Mucinex DM, drink plenty of water, rest and utilize albuterol inhaler every 6 hours. Flonase OTC for sinus pressure. 1. Acute cough    - POC COVID-19  - POCT Influenza A/B    2.  Sinus pressure    - POC COVID-19  - POCT Influenza A/B    3. Body aches    - POC COVID-19  - POCT Influenza A/B    Return if symptoms worsen or fail to improve. Sonam Dubois is a 50 y.o. female being evaluated by a Virtual Visit (video visit) encounter to address concerns as mentioned above. A caregiver was present when appropriate. Due to this being a TeleHealth encounter (During [facility] public health emergency), evaluation of the following organ systems was limited: Vitals/Constitutional/EENT/Resp/CV/GI//MS/Neuro/Skin/Heme-Lymph-Imm. Pursuant to the emergency declaration under the [facility], [address] [place] [address] authority and the [employer] Act, this Virtual Visit was conducted with patient's (and/or legal guardian's) consent, to reduce the patient's risk of exposure to COVID-19 and provide necessary medical care. The patient (and/or legal guardian) has also been advised to contact this office for worsening conditions or problems, and seek emergency medical treatment and/or call 911 if deemed necessary. Services were provided through a video synchronous discussion virtually to substitute for in-person clinic visit. Patient and provider were located at their individual homes. --ROGELIO Cool - CNP on 12/28/2022 at 9:29 AM    An electronic signature was used to authenticate this note. This chart was generated using the [employer] dictation system. I created this record but it may contain dictation errors due to the limitation of the software. This is a telehealth visit. Verbal consent to participate in video visit was obtained.  Pursuant to the emergency declaration under the [facility], [facility] waiver authority and the [employer] Act, this Virtual Visit was conducted, with patient's consent, to reduce the patient's risk of exposure to COVID-19 and provide continuity of care for an established/new patient. Services were provided through a video synchronous discussion virtually to substitute for in-person clinic visit. I discussed with the patient the nature of our telehealth visits via interactive/real-time audio/video that:   - I would evaluate the patient and recommend diagnostics and treatments based on my assessment   - Our sessions are not being recorded and that personal health information is protected   - Our team would provide follow up care in person if/when the patient needs it.

## 2022-12-28 NOTE — PATIENT INSTRUCTIONS
Plan  Recommend symptom control with over the counter medication. 1. Water: Drink 1/2 of body weight (lb) in ounces,  Up to 90 Ounces of water per day. This will loosen mucus in the head and chest & improve the weak feeling of dehydration, allow the body to get germ fighting resources to the infection. Half can be juice or sugar free powerade or garorate. Don't count drinks with caffeine or carbonation. Infants can have Pedialyte liquid or freezer pops. Avoid salt if you have high Blood Pressure, swelling in the feet or ankles or have heart problems. 2. Sleep: Get 8-10 hours a night and rest during the evening after work or school. If you have trouble sleeping, adults can take Melatonin 5mg up to 2 tabs at bedtime ( not for children or pregnant women). 3.Cough: Take cough medicines with Guaifenesin ( to loosen chest or head congestion) and Dextromethorphan ( to decrease excess cough). Robitussin D.M. Syrup every 4-6 hrs or Mucinex D. M. pills twice a day. Use the pediatric formulations for children over 6 months making sure they are alcohol & sugar free for children, pregnant women, and diabetics. 4. Pain And Fevers: Take Acetaminophen ( Tylenol) for fevers, aches, and headaches. 2-500 mg every 8 hours for adults. Appropriate doses at bedtime for children may help them sleep better. Ibuprofen may be used if not pregnant, but should be given with food to avoid nausea. Avoid Ibuprofen if you have high blood pressure, CHF, or kidney problems. 5.Gargle: (DAY ONE OF SYMPTOMS) Gargle in the back of the throat with the head tilted back and to the sides with a strong mouthwash  ( Listerine or Scope) after meals and at bedtime at least 4 -5 times a day. This helps kill bacteria and viruses in the back of the throat and will shorten the duration and decrease the severity of your symptoms: sore throat, cough, ear popping,/ear pain, and possibly dizziness.     6. Smoking: Avoid smoking or exposure to second hand smoke.    7. Zinc: (DAY ONE OF SYMPTOMS)  Zinc lozenges such as Cold Thomas (available most stores), or Basic ([employer] brand) will help shorten the duration and lessen symptoms such as sore throat, cough, nasal congestion, runny nose, and post nasal drip. Use 1 lozenge every 2-4 hours ( after meals if stomach is sensitive). Children can use 10-15 mg or less 3-4 times a day or Zinc lollypops. In pregnancy limit to 50-60 mg a day for 7 days as prenatals have Zinc also. With diarrhea use zinc pills 50 mg 1/2 to 1 pill 2x/day. 8. Vitamins: Vitamin C 500 mg with breakfast and dinner. Children and pregnant women should drink citrus juices. This speeds healing and strengthens immune system    9. Chest Symptoms: Vicks Vapor rub to the chest at bedtime. 10. Decongestants: Avoid all decongestants if you have high blood pressure. Safe to take if you do not have high blood pressure. 11.Try all of the above starting with day 1 of symptoms. Adults with fevers over 103 degrees or shortness of breath should call the office immediately. 12. Nasal saline spray or rinse. There are many different ways to do this OTC. I hope that you feel better. If you do not feel better after treatment, please f/u with pcp.

## 2023-01-23 LAB
AVERAGE GLUCOSE: 163
CHOLESTEROL, TOTAL: 152 MG/DL
CHOLESTEROL/HDL RATIO: NORMAL
HBA1C MFR BLD: 7.3 %
HDLC SERPL-MCNC: 43 MG/DL (ref 35–70)
LDL CHOLESTEROL CALCULATED: 85 MG/DL (ref 0–160)
NONHDLC SERPL-MCNC: 109 MG/DL
TRIGL SERPL-MCNC: 133 MG/DL
VLDLC SERPL CALC-MCNC: NORMAL MG/DL

## 2023-04-11 ENCOUNTER — OFFICE VISIT (OUTPATIENT)
Dept: FAMILY MEDICINE CLINIC | Age: 53
End: 2023-04-11
Payer: MEDICARE

## 2023-04-11 VITALS
BODY MASS INDEX: 46.78 KG/M2 | SYSTOLIC BLOOD PRESSURE: 126 MMHG | OXYGEN SATURATION: 97 % | HEIGHT: 64 IN | HEART RATE: 74 BPM | DIASTOLIC BLOOD PRESSURE: 78 MMHG | WEIGHT: 274 LBS

## 2023-04-11 DIAGNOSIS — Z12.11 SCREENING FOR MALIGNANT NEOPLASM OF COLON: ICD-10-CM

## 2023-04-11 DIAGNOSIS — E11.42 TYPE 2 DIABETES MELLITUS WITH DIABETIC POLYNEUROPATHY, WITHOUT LONG-TERM CURRENT USE OF INSULIN (HCC): Primary | ICD-10-CM

## 2023-04-11 DIAGNOSIS — F41.9 ANXIETY: ICD-10-CM

## 2023-04-11 DIAGNOSIS — R00.2 PALPITATIONS: ICD-10-CM

## 2023-04-11 PROCEDURE — 3074F SYST BP LT 130 MM HG: CPT | Performed by: FAMILY MEDICINE

## 2023-04-11 PROCEDURE — 3078F DIAST BP <80 MM HG: CPT | Performed by: FAMILY MEDICINE

## 2023-04-11 PROCEDURE — 99214 OFFICE O/P EST MOD 30 MIN: CPT | Performed by: FAMILY MEDICINE

## 2023-04-11 RX ORDER — HYDROXYZINE PAMOATE 25 MG/1
25 CAPSULE ORAL 3 TIMES DAILY PRN
Qty: 42 CAPSULE | Refills: 0 | Status: SHIPPED | OUTPATIENT
Start: 2023-04-11 | End: 2023-04-25

## 2023-04-11 ASSESSMENT — PATIENT HEALTH QUESTIONNAIRE - PHQ9
3. TROUBLE FALLING OR STAYING ASLEEP: 3
9. THOUGHTS THAT YOU WOULD BE BETTER OFF DEAD, OR OF HURTING YOURSELF: 0
8. MOVING OR SPEAKING SO SLOWLY THAT OTHER PEOPLE COULD HAVE NOTICED. OR THE OPPOSITE, BEING SO FIGETY OR RESTLESS THAT YOU HAVE BEEN MOVING AROUND A LOT MORE THAN USUAL: 1
SUM OF ALL RESPONSES TO PHQ QUESTIONS 1-9: 15
SUM OF ALL RESPONSES TO PHQ9 QUESTIONS 1 & 2: 3
SUM OF ALL RESPONSES TO PHQ QUESTIONS 1-9: 15
10. IF YOU CHECKED OFF ANY PROBLEMS, HOW DIFFICULT HAVE THESE PROBLEMS MADE IT FOR YOU TO DO YOUR WORK, TAKE CARE OF THINGS AT HOME, OR GET ALONG WITH OTHER PEOPLE: 1
7. TROUBLE CONCENTRATING ON THINGS, SUCH AS READING THE NEWSPAPER OR WATCHING TELEVISION: 1
6. FEELING BAD ABOUT YOURSELF - OR THAT YOU ARE A FAILURE OR HAVE LET YOURSELF OR YOUR FAMILY DOWN: 2
SUM OF ALL RESPONSES TO PHQ QUESTIONS 1-9: 15
2. FEELING DOWN, DEPRESSED OR HOPELESS: 2
SUM OF ALL RESPONSES TO PHQ QUESTIONS 1-9: 15
1. LITTLE INTEREST OR PLEASURE IN DOING THINGS: 1
5. POOR APPETITE OR OVEREATING: 2
4. FEELING TIRED OR HAVING LITTLE ENERGY: 3

## 2023-04-11 ASSESSMENT — ANXIETY QUESTIONNAIRES
5. BEING SO RESTLESS THAT IT IS HARD TO SIT STILL: 2
GAD7 TOTAL SCORE: 13
4. TROUBLE RELAXING: 3
1. FEELING NERVOUS, ANXIOUS, OR ON EDGE: 2
6. BECOMING EASILY ANNOYED OR IRRITABLE: 1
7. FEELING AFRAID AS IF SOMETHING AWFUL MIGHT HAPPEN: 1
2. NOT BEING ABLE TO STOP OR CONTROL WORRYING: 2
3. WORRYING TOO MUCH ABOUT DIFFERENT THINGS: 2

## 2023-04-17 RX ORDER — LISINOPRIL 40 MG/1
TABLET ORAL
Qty: 30 TABLET | Refills: 3 | Status: SHIPPED | OUTPATIENT
Start: 2023-04-17

## 2023-04-18 ENCOUNTER — HOSPITAL ENCOUNTER (OUTPATIENT)
Dept: NON INVASIVE DIAGNOSTICS | Age: 53
Discharge: HOME | End: 2023-04-18
Payer: MEDICARE

## 2023-04-18 DIAGNOSIS — R00.2 PALPITATIONS: ICD-10-CM

## 2023-04-18 LAB
LV EF: 55 %
LVEF MODALITY: NORMAL

## 2023-04-18 PROCEDURE — 93306 TTE W/DOPPLER COMPLETE: CPT

## 2023-04-21 NOTE — PROGRESS NOTES
Heart sounds: Normal heart sounds. Pulmonary:      Effort: Pulmonary effort is normal.      Breath sounds: Normal breath sounds. Abdominal:      General: Bowel sounds are normal.      Palpations: Abdomen is soft. Musculoskeletal:         General: Normal range of motion. Cervical back: Normal range of motion and neck supple. Skin:     General: Skin is dry. Neurological:      Mental Status: She is alert and oriented to person, place, and time. Deep Tendon Reflexes: Reflexes are normal and symmetric. Psychiatric:         Behavior: Behavior normal.         Thought Content: Thought content normal.         Judgment: Judgment normal.            An electronic signature was used to authenticate this note.     --Cabrera Aguirre MD

## 2023-05-17 NOTE — PATIENT INSTRUCTIONS
Orders Only on 06/04/2019   Component Date Value Ref Range Status    TSH 06/04/2019 2.800  0.270 - 4.200 mIU/L Final    Comment: (NOTE)  Ingestion of stephany doses of biotin (>5 mg/day) taken within 8 hours of  drawing blood sample can interfere with this immunoassay test.      Creatinine, Ur 06/04/2019 210.7  mg/dL Final    Tested at [address]   Orders Only on 06/04/2019   Component Date Value Ref Range Status    Cholesterol, Total 06/04/2019 133  <=200 mg/dL Final    Comment: (NOTE)  < 200            Desirable  200 - 239     Borderline High  >= 240          High      Triglycerides 06/04/2019 110  <=150 mg/dL Final    Comment: (NOTE)  < 150         Normal  150 - 199    Borderline High  200 - 499    High   >= 500       Very High      HDL 06/04/2019 35* >=40 mg/dL Final    Comment: (NOTE)   > 60      Optimal  40 - 60    Acceptable    < 40      Low      LDL Calculated 06/04/2019 76  <=100 mg/dL Final    Cholesterol 06/04/2019 98  <=129 mg/dL Final    Comment: (NOTE)  <130    Desirable  130-159 Above Desirable  160-189 Borderline High  190-219 High  >= 220  Very High     Orders Only on 06/04/2019   Component Date Value Ref Range Status    Microalbumin, Random Urine 06/04/2019 <12.0  mg/L Final    Creatinine, Ur 06/04/2019 208.1  mg/dL Final    Microalbumin Creatinine Ratio 06/04/2019 See Comment  0 - 30 mg/g Final    Comment: (NOTE)  Because the albumin level is below the level of detection in this  urine specimen, the laboratory is unable to calculate a reliable  albumin/creatinine ratio. Microalbuminuria is unlikely if the urine albumin concentration is  less than 20-30 mg/L in a random specimen.      Orders Only on 06/04/2019   Component Date Value Ref Range Status    Hemoglobin A1C 06/04/2019 10.2* 4.2 - 5.6 % Final    eAG 06/04/2019 246  mg/dL Final    Comment: (NOTE)  REFERENCE RANGE:  Normal: 4.0-5.6%  Pre-diabetes: 5.7-6.4%  Provisional diagnosis of diabetes: Orders signed for immunity testing.  She can do these thru a lab visit and results will be in MyChart.  A couple of the tests take a couple of days.    Herberth Moore MD     >6.4%  Hgb F>10% and anything which shortens red cell survival, such as  hemolytic anemia, or unstable hemoglobin variants such as HbSS, HbSC,  or HbCC, will lower the HbA1c value associated with a given level of  glycemic control.

## 2023-05-22 ENCOUNTER — TELEMEDICINE (OUTPATIENT)
Dept: FAMILY MEDICINE CLINIC | Age: 53
End: 2023-05-22
Payer: MEDICARE

## 2023-05-22 DIAGNOSIS — Z79.4 TYPE 2 DIABETES MELLITUS WITH DIABETIC POLYNEUROPATHY, WITH LONG-TERM CURRENT USE OF INSULIN (HCC): ICD-10-CM

## 2023-05-22 DIAGNOSIS — J18.9 COMMUNITY ACQUIRED PNEUMONIA, UNSPECIFIED LATERALITY: Primary | ICD-10-CM

## 2023-05-22 DIAGNOSIS — E11.42 TYPE 2 DIABETES MELLITUS WITH DIABETIC POLYNEUROPATHY, WITH LONG-TERM CURRENT USE OF INSULIN (HCC): ICD-10-CM

## 2023-05-22 PROCEDURE — 99214 OFFICE O/P EST MOD 30 MIN: CPT | Performed by: STUDENT IN AN ORGANIZED HEALTH CARE EDUCATION/TRAINING PROGRAM

## 2023-05-22 RX ORDER — AZITHROMYCIN 500 MG/1
500 TABLET, FILM COATED ORAL DAILY
Qty: 3 TABLET | Refills: 0 | Status: SHIPPED | OUTPATIENT
Start: 2023-05-22 | End: 2023-05-25

## 2023-05-22 RX ORDER — GUAIFENESIN 600 MG/1
600 TABLET, EXTENDED RELEASE ORAL 2 TIMES DAILY
Qty: 30 TABLET | Refills: 0 | Status: SHIPPED | OUTPATIENT
Start: 2023-05-22 | End: 2023-06-06

## 2023-05-22 RX ORDER — AMOXICILLIN AND CLAVULANATE POTASSIUM 875; 125 MG/1; MG/1
1 TABLET, FILM COATED ORAL 2 TIMES DAILY
Qty: 10 TABLET | Refills: 0 | Status: SHIPPED | OUTPATIENT
Start: 2023-05-22 | End: 2023-05-27

## 2023-05-22 NOTE — PROGRESS NOTES
left tibia, benign    Hypertension     Ovarian cyst     Psoriasis     Type II or unspecified type diabetes mellitus without mention of complication, not stated as uncontrolled      Patient Active Problem List   Diagnosis    Diabetes mellitus without complication ([facility]    Hypertension    Hyperlipidemia    Morbid obesity ([facility]    Plaque psoriasis    Vitamin D deficiency    Tobacco dependence    Type 2 diabetes mellitus with diabetic polyneuropathy, without long-term current use of insulin ([facility]       PE  There were no vitals filed for this visit. Estimated body mass index is 47.03 kg/m² as calculated from the following:    Height as of 4/11/23: 5' 4\" (1.626 m). Weight as of 4/11/23: 274 lb (124.3 kg). Gen: No audible distress. Pt lying in bed with no appearance of respiratory distress, tachypnea, nasal flaring etc  Resp: Speaking comfortably with no evidence of dyspnea, wheezing or other audible signs of increased respiratory effort. On this date 5/22/2023 I have spent 20 minutes reviewing previous notes, test results and face to face (virtual) with the patient discussing the diagnosis and importance of compliance with the treatment plan as well as documenting on the day of the visit. Due to this being a TeleHealth encounter, evaluation of the following organ systems was limited: Vitals/Constitutional/EENT/Resp/CV/GI//MS/Neuro/Skin/Heme-Lymph-Imm. The patient was evaluated through a synchronous (real-time) audio-video encounter. The patient (or guardian if applicable) is aware that this is a billable service. Verbal consent to proceed has been obtained within the past 12 months. The visit was conducted pursuant to the emergency declaration under the [facility], [address] [place] [address] authority and the [employer] Act. Patient identification was verified, and a caregiver was present when appropriate.  The patient was located

## 2023-05-23 ENCOUNTER — APPOINTMENT (OUTPATIENT)
Dept: GENERAL RADIOLOGY | Age: 53
End: 2023-05-23
Payer: MEDICARE

## 2023-05-23 ENCOUNTER — HOSPITAL ENCOUNTER (EMERGENCY)
Age: 53
Discharge: HOME | End: 2023-05-23
Attending: STUDENT IN AN ORGANIZED HEALTH CARE EDUCATION/TRAINING PROGRAM
Payer: MEDICARE

## 2023-05-23 VITALS
SYSTOLIC BLOOD PRESSURE: 144 MMHG | WEIGHT: 270 LBS | OXYGEN SATURATION: 94 % | TEMPERATURE: 98.8 F | RESPIRATION RATE: 18 BRPM | BODY MASS INDEX: 42.38 KG/M2 | DIASTOLIC BLOOD PRESSURE: 115 MMHG | HEART RATE: 88 BPM | HEIGHT: 67 IN

## 2023-05-23 DIAGNOSIS — J20.9 ACUTE BRONCHITIS, UNSPECIFIED ORGANISM: ICD-10-CM

## 2023-05-23 DIAGNOSIS — R07.89 ATYPICAL CHEST PAIN: Primary | ICD-10-CM

## 2023-05-23 LAB
ALBUMIN SERPL-MCNC: 3.8 G/DL (ref 3.4–5)
ALBUMIN/GLOB SERPL: 1.1 {RATIO} (ref 1.1–2.2)
ALP SERPL-CCNC: 109 U/L (ref 40–129)
ALT SERPL-CCNC: 13 U/L (ref 10–40)
ANION GAP SERPL CALCULATED.3IONS-SCNC: 11 MMOL/L (ref 3–16)
AST SERPL-CCNC: 15 U/L (ref 15–37)
BASOPHILS # BLD: 0.1 K/UL (ref 0–0.2)
BASOPHILS NFR BLD: 0.6 %
BILIRUB SERPL-MCNC: 0.4 MG/DL (ref 0–1)
BUN SERPL-MCNC: 10 MG/DL (ref 7–20)
CALCIUM SERPL-MCNC: 9.5 MG/DL (ref 8.3–10.6)
CHLORIDE SERPL-SCNC: 104 MMOL/L (ref 99–110)
CO2 SERPL-SCNC: 27 MMOL/L (ref 21–32)
CREAT SERPL-MCNC: <0.5 MG/DL (ref 0.6–1.1)
DEPRECATED RDW RBC AUTO: 14.2 % (ref 12.4–15.4)
EKG ATRIAL RATE: 89 BPM
EKG DIAGNOSIS: NORMAL
EKG P AXIS: 41 DEGREES
EKG P-R INTERVAL: 146 MS
EKG Q-T INTERVAL: 362 MS
EKG QRS DURATION: 88 MS
EKG QTC CALCULATION (BAZETT): 440 MS
EKG R AXIS: -13 DEGREES
EKG T AXIS: 44 DEGREES
EKG VENTRICULAR RATE: 89 BPM
EOSINOPHIL # BLD: 0.5 K/UL (ref 0–0.6)
EOSINOPHIL NFR BLD: 3.7 %
FLUAV RNA RESP QL NAA+PROBE: NOT DETECTED
FLUBV RNA RESP QL NAA+PROBE: NOT DETECTED
GFR SERPLBLD CREATININE-BSD FMLA CKD-EPI: >60 ML/MIN/{1.73_M2}
GLUCOSE SERPL-MCNC: 160 MG/DL (ref 70–99)
HCT VFR BLD AUTO: 39.8 % (ref 36–48)
HGB BLD-MCNC: 13 G/DL (ref 12–16)
LIPASE SERPL-CCNC: 29 U/L (ref 13–60)
LYMPHOCYTES # BLD: 2.7 K/UL (ref 1–5.1)
LYMPHOCYTES NFR BLD: 22 %
MCH RBC QN AUTO: 28.2 PG (ref 26–34)
MCHC RBC AUTO-ENTMCNC: 32.5 G/DL (ref 31–36)
MCV RBC AUTO: 86.8 FL (ref 80–100)
MONOCYTES # BLD: 0.7 K/UL (ref 0–1.3)
MONOCYTES NFR BLD: 5.4 %
NEUTROPHILS # BLD: 8.5 K/UL (ref 1.7–7.7)
NEUTROPHILS NFR BLD: 68.3 %
NT-PROBNP SERPL-MCNC: 320 PG/ML (ref 0–124)
PLATELET # BLD AUTO: 211 K/UL (ref 135–450)
PMV BLD AUTO: 8.4 FL (ref 5–10.5)
POTASSIUM SERPL-SCNC: 4.2 MMOL/L (ref 3.5–5.1)
PROT SERPL-MCNC: 7.3 G/DL (ref 6.4–8.2)
RBC # BLD AUTO: 4.59 M/UL (ref 4–5.2)
SARS-COV-2 RNA RESP QL NAA+PROBE: NOT DETECTED
SODIUM SERPL-SCNC: 142 MMOL/L (ref 136–145)
TROPONIN, HIGH SENSITIVITY: 7 NG/L (ref 0–14)
TROPONIN, HIGH SENSITIVITY: 7 NG/L (ref 0–14)
WBC # BLD AUTO: 12.5 K/UL (ref 4–11)

## 2023-05-23 PROCEDURE — 6360000002 HC RX W HCPCS: Performed by: STUDENT IN AN ORGANIZED HEALTH CARE EDUCATION/TRAINING PROGRAM

## 2023-05-23 PROCEDURE — 96375 TX/PRO/DX INJ NEW DRUG ADDON: CPT

## 2023-05-23 PROCEDURE — 83690 ASSAY OF LIPASE: CPT

## 2023-05-23 PROCEDURE — 6370000000 HC RX 637 (ALT 250 FOR IP)

## 2023-05-23 PROCEDURE — 96374 THER/PROPH/DIAG INJ IV PUSH: CPT

## 2023-05-23 PROCEDURE — 99285 EMERGENCY DEPT VISIT HI MDM: CPT

## 2023-05-23 PROCEDURE — 6360000002 HC RX W HCPCS: Performed by: PHYSICIAN ASSISTANT

## 2023-05-23 PROCEDURE — 83880 ASSAY OF NATRIURETIC PEPTIDE: CPT

## 2023-05-23 PROCEDURE — 71045 X-RAY EXAM CHEST 1 VIEW: CPT

## 2023-05-23 PROCEDURE — 80053 COMPREHEN METABOLIC PANEL: CPT

## 2023-05-23 PROCEDURE — 85025 COMPLETE CBC W/AUTO DIFF WBC: CPT

## 2023-05-23 PROCEDURE — 87636 SARSCOV2 & INF A&B AMP PRB: CPT

## 2023-05-23 PROCEDURE — 84484 ASSAY OF TROPONIN QUANT: CPT

## 2023-05-23 PROCEDURE — 6370000000 HC RX 637 (ALT 250 FOR IP): Performed by: PHYSICIAN ASSISTANT

## 2023-05-23 PROCEDURE — 93010 ELECTROCARDIOGRAM REPORT: CPT | Performed by: INTERNAL MEDICINE

## 2023-05-23 PROCEDURE — 6370000000 HC RX 637 (ALT 250 FOR IP): Performed by: STUDENT IN AN ORGANIZED HEALTH CARE EDUCATION/TRAINING PROGRAM

## 2023-05-23 PROCEDURE — 93005 ELECTROCARDIOGRAM TRACING: CPT | Performed by: STUDENT IN AN ORGANIZED HEALTH CARE EDUCATION/TRAINING PROGRAM

## 2023-05-23 RX ORDER — IPRATROPIUM BROMIDE AND ALBUTEROL SULFATE 2.5; .5 MG/3ML; MG/3ML
SOLUTION RESPIRATORY (INHALATION)
Status: COMPLETED
Start: 2023-05-23 | End: 2023-05-23

## 2023-05-23 RX ORDER — PREDNISONE 50 MG/1
50 TABLET ORAL DAILY
Qty: 5 TABLET | Refills: 0 | Status: SHIPPED | OUTPATIENT
Start: 2023-05-23 | End: 2023-05-28

## 2023-05-23 RX ORDER — ACETAMINOPHEN 500 MG
1000 TABLET ORAL ONCE
Status: COMPLETED | OUTPATIENT
Start: 2023-05-23 | End: 2023-05-23

## 2023-05-23 RX ORDER — ONDANSETRON 2 MG/ML
4 INJECTION INTRAMUSCULAR; INTRAVENOUS ONCE
Status: COMPLETED | OUTPATIENT
Start: 2023-05-23 | End: 2023-05-23

## 2023-05-23 RX ORDER — IPRATROPIUM BROMIDE AND ALBUTEROL SULFATE 2.5; .5 MG/3ML; MG/3ML
1 SOLUTION RESPIRATORY (INHALATION) EVERY 4 HOURS
Qty: 360 ML | Refills: 0 | Status: SHIPPED | OUTPATIENT
Start: 2023-05-23

## 2023-05-23 RX ORDER — BENZONATATE 100 MG/1
100 CAPSULE ORAL 2 TIMES DAILY PRN
Qty: 20 CAPSULE | Refills: 0 | Status: SHIPPED | OUTPATIENT
Start: 2023-05-23 | End: 2023-05-30

## 2023-05-23 RX ORDER — IBUPROFEN 600 MG/1
600 TABLET ORAL ONCE
Status: COMPLETED | OUTPATIENT
Start: 2023-05-23 | End: 2023-05-23

## 2023-05-23 RX ORDER — IPRATROPIUM BROMIDE AND ALBUTEROL SULFATE 2.5; .5 MG/3ML; MG/3ML
1 SOLUTION RESPIRATORY (INHALATION)
Status: DISCONTINUED | OUTPATIENT
Start: 2023-05-23 | End: 2023-05-23 | Stop reason: HOSPADM

## 2023-05-23 RX ORDER — DEXAMETHASONE SODIUM PHOSPHATE 4 MG/ML
10 INJECTION, SOLUTION INTRA-ARTICULAR; INTRALESIONAL; INTRAMUSCULAR; INTRAVENOUS; SOFT TISSUE ONCE
Status: COMPLETED | OUTPATIENT
Start: 2023-05-23 | End: 2023-05-23

## 2023-05-23 RX ORDER — NEBULIZER ACCESSORIES
1 KIT MISCELLANEOUS DAILY PRN
Qty: 1 KIT | Refills: 0 | Status: SHIPPED | OUTPATIENT
Start: 2023-05-23

## 2023-05-23 RX ORDER — BENZONATATE 100 MG/1
100 CAPSULE ORAL 3 TIMES DAILY PRN
Status: DISCONTINUED | OUTPATIENT
Start: 2023-05-23 | End: 2023-05-23 | Stop reason: HOSPADM

## 2023-05-23 RX ADMIN — IBUPROFEN 600 MG: 600 TABLET, FILM COATED ORAL at 04:07

## 2023-05-23 RX ADMIN — BENZONATATE 100 MG: 100 CAPSULE ORAL at 01:54

## 2023-05-23 RX ADMIN — ACETAMINOPHEN 1000 MG: 500 TABLET ORAL at 04:07

## 2023-05-23 RX ADMIN — IPRATROPIUM BROMIDE AND ALBUTEROL SULFATE 1 AMPULE: 2.5; .5 SOLUTION RESPIRATORY (INHALATION) at 04:03

## 2023-05-23 RX ADMIN — IPRATROPIUM BROMIDE AND ALBUTEROL SULFATE 1 AMPULE: .5; 3 SOLUTION RESPIRATORY (INHALATION) at 04:03

## 2023-05-23 RX ADMIN — ONDANSETRON 4 MG: 2 INJECTION INTRAMUSCULAR; INTRAVENOUS at 01:54

## 2023-05-23 RX ADMIN — DEXAMETHASONE SODIUM PHOSPHATE 10 MG: 4 INJECTION, SOLUTION INTRAMUSCULAR; INTRAVENOUS at 03:31

## 2023-05-23 ASSESSMENT — PAIN DESCRIPTION - FREQUENCY: FREQUENCY: CONTINUOUS

## 2023-05-23 ASSESSMENT — PAIN SCALES - GENERAL
PAINLEVEL_OUTOF10: 8
PAINLEVEL_OUTOF10: 6
PAINLEVEL_OUTOF10: 8

## 2023-05-23 ASSESSMENT — PAIN DESCRIPTION - LOCATION
LOCATION: BACK;CHEST
LOCATION: ABDOMEN;CHEST;BACK;SHOULDER
LOCATION: BACK;ABDOMEN;CHEST

## 2023-05-23 ASSESSMENT — PAIN DESCRIPTION - PAIN TYPE: TYPE: CHRONIC PAIN

## 2023-05-23 ASSESSMENT — PAIN - FUNCTIONAL ASSESSMENT: PAIN_FUNCTIONAL_ASSESSMENT: 0-10

## 2023-05-23 ASSESSMENT — PAIN DESCRIPTION - DESCRIPTORS: DESCRIPTORS: ACHING

## 2023-05-23 NOTE — ED PROVIDER NOTES
[facility]  ED  Emergency Department Encounter    Patient Name: Linwood Viera  MRN: [id number]  [place]: 1970  Date of Evaluation: 5/23/2023  Provider: Amber Aranda MD  Note Started: 1:46 AM EDT 5/23/23    CHIEF COMPLAINT  Chest Pain (Chest pain since yesterday, states the pain radiates to both arms. Pt with multiple complaints, states she is having mid back pain, shoulder pain, abdominal pain, SOB, states she has been coughing so much she is \"choking to death. \" States she was just diagnosed with CHF. States she had a virtual doctor appt today and was told she has pneumonia. Was placed on 2 medications-augmentin and azithromycin. )    SHARED SERVICE VISIT  I have seen and evaluated this patient with my supervising physician, Dr. Edward Santillan. HISTORY OF PRESENT ILLNESS  Linwood Viera is a 48 y.o. female who presents to the ED due to history of URI-like symptoms. Patient brought in by daughter today for evaluation. Patient states that she developed a cough approximately 2 days ago productive for yellow sputum. She has a pack per day cigarette smoker. She reports bilateral chest pain. Worse when coughing. Denies history of asthma or COPD. No pain with deep inspiration. Reports that she has had mild headache with none currently. No nasal congestion sore throat. Has had some abdominal discomfort associated with nausea and vomiting. No diarrhea or constipation. She denies urinary symptoms. No sick contacts. No recent travel, trauma or surgery. .    No other complaints, modifying factors or associated symptoms. Nursing notes reviewed were all reviewed and agreed with or any disagreements were addressed in the HPI.     PMH:  Past Medical History:   Diagnosis Date    Anxiety     Bone mass     left tibia, benign    Hypertension     Ovarian cyst     Psoriasis     Type II or unspecified type diabetes mellitus without mention of complication, not stated as uncontrolled      Surgical

## 2023-05-24 NOTE — ED PROVIDER NOTES
Attending Supervisory Note/Shared Visit     I personally saw the patient and performed a substantive portion of the visit including all aspects of the medical decision making as addressed:    47 yo F with atypical chest pain, viral prodromal features, wheezing throughout on exam, was treated empirically for clinical PNA by her OP doctor. Has Rx abx at home. More c/w bronchitis/COPD exacerbation as pt smokes but has no dx hx of COPD. Has marked improvement after nebs, steroids ordered by me.  + leukocytosis. Trop normal and stable x 2. No PNA or other acute chest process on CXR. EKG without acute ischemic features. Stable for and amenable to d/c home. Rx meds as below. Medications   ondansetron (ZOFRAN) injection 4 mg (4 mg IntraVENous Given 5/23/23 0154)   Dexamethasone Sodium Phosphate injection 10 mg (10 mg IntraVENous Given 5/23/23 0331)   ibuprofen (ADVIL;MOTRIN) tablet 600 mg (600 mg Oral Given 5/23/23 0407)   acetaminophen (TYLENOL) tablet 1,000 mg (1,000 mg Oral Given 5/23/23 0407)       XR CHEST PORTABLE   Final Result   Normal examination. Labs Reviewed   CBC WITH AUTO DIFFERENTIAL - Abnormal; Notable for the following components:       Result Value    WBC 12.5 (*)     Neutrophils Absolute 8.5 (*)     All other components within normal limits   COMPREHENSIVE METABOLIC PANEL W/ REFLEX TO MG FOR LOW K - Abnormal; Notable for the following components:    Glucose 160 (*)     Creatinine <0.5 (*)     All other components within normal limits   BRAIN NATRIURETIC PEPTIDE - Abnormal; Notable for the following components:    Pro- (*)     All other components within normal limits   COVID-19 & INFLUENZA COMBO   TROPONIN   TROPONIN   LIPASE       The Ekg interpreted by me shows  Rhythm NSR  Rate of 89 bpm  Axis is  normal  Intervals and durations normal  ST Segments: normal, poss LVH  Compared to prior EKG dated 11/25/20, no significant change.     I estimate there is LOW risk for (including

## 2023-05-26 ENCOUNTER — TELEPHONE (OUTPATIENT)
Dept: FAMILY MEDICINE CLINIC | Age: 53
End: 2023-05-26

## 2023-06-02 ENCOUNTER — OFFICE VISIT (OUTPATIENT)
Dept: FAMILY MEDICINE CLINIC | Age: 53
End: 2023-06-02
Payer: MEDICARE

## 2023-06-02 VITALS
HEIGHT: 67 IN | RESPIRATION RATE: 16 BRPM | OXYGEN SATURATION: 95 % | DIASTOLIC BLOOD PRESSURE: 80 MMHG | BODY MASS INDEX: 42.22 KG/M2 | HEART RATE: 88 BPM | WEIGHT: 269 LBS | SYSTOLIC BLOOD PRESSURE: 116 MMHG

## 2023-06-02 DIAGNOSIS — R30.0 DYSURIA: Primary | ICD-10-CM

## 2023-06-02 LAB
BILIRUBIN, POC: NORMAL
BLOOD URINE, POC: NORMAL
CLARITY, POC: NORMAL
COLOR, POC: NORMAL
GLUCOSE URINE, POC: NORMAL
KETONES, POC: NORMAL
LEUKOCYTE EST, POC: NORMAL
NITRITE, POC: POSITIVE
PH, POC: 5.5
PROTEIN, POC: NORMAL
SPECIFIC GRAVITY, POC: >=1.03
UROBILINOGEN, POC: NORMAL

## 2023-06-02 PROCEDURE — 81002 URINALYSIS NONAUTO W/O SCOPE: CPT | Performed by: REGISTERED NURSE

## 2023-06-02 PROCEDURE — 3074F SYST BP LT 130 MM HG: CPT | Performed by: REGISTERED NURSE

## 2023-06-02 PROCEDURE — 3079F DIAST BP 80-89 MM HG: CPT | Performed by: REGISTERED NURSE

## 2023-06-02 PROCEDURE — 99214 OFFICE O/P EST MOD 30 MIN: CPT | Performed by: REGISTERED NURSE

## 2023-06-02 RX ORDER — INSULIN LISPRO-AABC 100 [IU]/ML
INJECTION, SOLUTION SUBCUTANEOUS
COMMUNITY
Start: 2023-03-07

## 2023-06-02 RX ORDER — SULFAMETHOXAZOLE AND TRIMETHOPRIM 800; 160 MG/1; MG/1
1 TABLET ORAL 2 TIMES DAILY
Qty: 14 TABLET | Refills: 0 | Status: SHIPPED | OUTPATIENT
Start: 2023-06-02 | End: 2023-06-09

## 2023-06-02 RX ORDER — PHENAZOPYRIDINE HYDROCHLORIDE 100 MG/1
100 TABLET, FILM COATED ORAL 3 TIMES DAILY PRN
Qty: 9 TABLET | Refills: 0 | Status: SHIPPED | OUTPATIENT
Start: 2023-06-02 | End: 2023-06-05

## 2023-06-02 ASSESSMENT — ENCOUNTER SYMPTOMS
RESPIRATORY NEGATIVE: 1
GASTROINTESTINAL NEGATIVE: 1

## 2023-06-02 NOTE — PROGRESS NOTES
Patient: Kaylene Hull is a 48 y.o. female who presents today with the following Chief Complaint(s):  Chief Complaint   Patient presents with    Dysuria     X2 days, frequency, painful when urinating, urgency, blood in urine       Assessment/Plan:    1. Dysuria  Symptoms consistent with UTI. POCT UA positive for blood, nitrates. Will send for culture to ensure optimal antibiotic course. Start Bactrim, Pyridium PRN dysuria. Drink plenty of water. Avoid soda. - POCT Urinalysis no Micro  - Culture, Urine  - sulfamethoxazole-trimethoprim (BACTRIM DS;SEPTRA DS) 800-160 MG per tablet; Take 1 tablet by mouth 2 times daily for 7 days  Dispense: 14 tablet; Refill: 0  - phenazopyridine (PYRIDIUM) 100 MG tablet; Take 1 tablet by mouth 3 times daily as needed for Pain  Dispense: 9 tablet; Refill: 0      Return if symptoms worsen or fail to improve. HPI:     She is here for pain with urination for 2 days. She saw blood when she wiped today after urinating. She used to get urinary tract infections frequently. She had decreased how much soda she was drinking and since then she has not had as many UTIs. She recently started drinking soda again. This feels like urinary tract infections she has had before. Dysuria   This is a new problem. The current episode started yesterday. The problem occurs every urination. The problem has been gradually worsening. The quality of the pain is described as burning. The pain is moderate. There has been no fever. Associated symptoms include frequency, hematuria and urgency. Pertinent negatives include no chills, discharge or flank pain. She has tried increased fluids for the symptoms. The treatment provided no relief. Her past medical history is significant for recurrent UTIs.      Current Outpatient Medications   Medication Sig Dispense Refill    LYUMJEV KWIKPEN 100 UNIT/ML SOPN       sulfamethoxazole-trimethoprim (BACTRIM DS;SEPTRA DS) 800-160 MG per tablet Take 1 tablet by mouth 2 times

## 2023-06-04 LAB
BACTERIA UR CULT: ABNORMAL
ORGANISM: ABNORMAL

## 2023-06-05 LAB
BACTERIA UR CULT: ABNORMAL
ORGANISM: ABNORMAL

## 2023-06-06 DIAGNOSIS — N30.01 ACUTE CYSTITIS WITH HEMATURIA: Primary | ICD-10-CM

## 2023-06-06 RX ORDER — NITROFURANTOIN 25; 75 MG/1; MG/1
100 CAPSULE ORAL 2 TIMES DAILY
Qty: 10 CAPSULE | Refills: 0 | Status: SHIPPED | OUTPATIENT
Start: 2023-06-06 | End: 2023-06-11

## 2023-06-09 ENCOUNTER — COMMUNITY OUTREACH (OUTPATIENT)
Dept: FAMILY MEDICINE CLINIC | Age: 53
End: 2023-06-09

## 2023-06-28 ENCOUNTER — TELEPHONE (OUTPATIENT)
Dept: ADMINISTRATIVE | Age: 53
End: 2023-06-28

## 2023-06-28 NOTE — TELEPHONE ENCOUNTER
Submitted PA for Varenicline Tartrate 0.5 MG X 11 &1 MG X 42 tablets  Via CMM Key: [id number] STATUS: Approved, Coverage Starts on: 3/29/2023 12:00:00 AM, Coverage Ends on: 9/26/2023 12:00:00. Please notify patient. Thank you.

## 2023-07-30 DIAGNOSIS — F17.200 TOBACCO DEPENDENCE: ICD-10-CM

## 2023-07-31 RX ORDER — VARENICLINE TARTRATE 25 MG
KIT ORAL
Qty: 1 EACH | Refills: 5 | Status: SHIPPED | OUTPATIENT
Start: 2023-07-31

## 2023-08-01 RX ORDER — LISINOPRIL 40 MG/1
TABLET ORAL
Qty: 30 TABLET | Refills: 3 | Status: SHIPPED | OUTPATIENT
Start: 2023-08-01

## 2023-08-01 NOTE — TELEPHONE ENCOUNTER
Last Office Visit  -  6/6/23  Next Office Visit  -  10/11/23    Last Filled  -    Last UDS -    Contract - [facility] office for [place] ([place] CONTACT: [phone])    Dr. Alicia Lopez and Dr. Lis Tenorio  Address: [address] [place] [address]  Phone: [phone]

## 2023-09-06 ENCOUNTER — APPOINTMENT (OUTPATIENT)
Dept: GENERAL RADIOLOGY | Age: 53
End: 2023-09-06
Payer: MEDICARE

## 2023-09-06 ENCOUNTER — HOSPITAL ENCOUNTER (EMERGENCY)
Age: 53
Discharge: HOME | End: 2023-09-06
Payer: MEDICARE

## 2023-09-06 VITALS
OXYGEN SATURATION: 96 % | HEIGHT: 67 IN | TEMPERATURE: 98.2 F | HEART RATE: 84 BPM | RESPIRATION RATE: 18 BRPM | WEIGHT: 267 LBS | DIASTOLIC BLOOD PRESSURE: 88 MMHG | BODY MASS INDEX: 41.91 KG/M2 | SYSTOLIC BLOOD PRESSURE: 159 MMHG

## 2023-09-06 DIAGNOSIS — S90.32XA CONTUSION OF LEFT FOOT, INITIAL ENCOUNTER: Primary | ICD-10-CM

## 2023-09-06 PROCEDURE — 99283 EMERGENCY DEPT VISIT LOW MDM: CPT

## 2023-09-06 PROCEDURE — 73630 X-RAY EXAM OF FOOT: CPT

## 2023-09-06 PROCEDURE — 73610 X-RAY EXAM OF ANKLE: CPT

## 2023-09-06 ASSESSMENT — PAIN - FUNCTIONAL ASSESSMENT: PAIN_FUNCTIONAL_ASSESSMENT: 0-10

## 2023-09-06 ASSESSMENT — PAIN SCALES - GENERAL: PAINLEVEL_OUTOF10: 8

## 2023-09-07 NOTE — DISCHARGE INSTRUCTIONS
You can buddy tape for comfort, wear supportive shoes and take ibuprofen or Tylenol for pain as needed. Follow-up with primary care physician in 1 week for reevaluation.

## 2023-09-07 NOTE — ED NOTES
Patient left via personal vehicle to private residence in stable condition. Patients vitals were assessed before discharge and were stable. Patient verbalized understanding of discharge instructions, follow up and medications. RN explained information in discharge packet and patient verbalized they have no questions at this time. Patient left ER with all paperwork and belongings that RN is aware of.         Eli Jordan RN  09/06/23 3229

## 2023-10-11 ENCOUNTER — OFFICE VISIT (OUTPATIENT)
Dept: FAMILY MEDICINE CLINIC | Age: 53
End: 2023-10-11
Payer: MEDICARE

## 2023-10-11 VITALS
SYSTOLIC BLOOD PRESSURE: 118 MMHG | HEART RATE: 71 BPM | HEIGHT: 67 IN | DIASTOLIC BLOOD PRESSURE: 76 MMHG | BODY MASS INDEX: 43.16 KG/M2 | WEIGHT: 275 LBS | OXYGEN SATURATION: 98 %

## 2023-10-11 DIAGNOSIS — Z23 NEED FOR PROPHYLACTIC VACCINATION AND INOCULATION AGAINST INFLUENZA: ICD-10-CM

## 2023-10-11 DIAGNOSIS — I10 HYPERTENSION, UNSPECIFIED TYPE: ICD-10-CM

## 2023-10-11 DIAGNOSIS — E11.42 TYPE 2 DIABETES MELLITUS WITH DIABETIC POLYNEUROPATHY, WITHOUT LONG-TERM CURRENT USE OF INSULIN (HCC): Primary | ICD-10-CM

## 2023-10-11 DIAGNOSIS — E55.9 VITAMIN D DEFICIENCY: ICD-10-CM

## 2023-10-11 LAB
CREAT UR-MCNC: 81.5 MG/DL (ref 28–259)
HBA1C MFR BLD: 7.4 %
MICROALBUMIN UR DL<=1MG/L-MCNC: 1.5 MG/DL
MICROALBUMIN/CREAT UR: 18.4 MG/G (ref 0–30)

## 2023-10-11 PROCEDURE — 3074F SYST BP LT 130 MM HG: CPT | Performed by: FAMILY MEDICINE

## 2023-10-11 PROCEDURE — 90674 CCIIV4 VAC NO PRSV 0.5 ML IM: CPT | Performed by: FAMILY MEDICINE

## 2023-10-11 PROCEDURE — G0008 ADMIN INFLUENZA VIRUS VAC: HCPCS | Performed by: FAMILY MEDICINE

## 2023-10-11 PROCEDURE — 3051F HG A1C>EQUAL 7.0%<8.0%: CPT | Performed by: FAMILY MEDICINE

## 2023-10-11 PROCEDURE — 3078F DIAST BP <80 MM HG: CPT | Performed by: FAMILY MEDICINE

## 2023-10-11 PROCEDURE — 83036 HEMOGLOBIN GLYCOSYLATED A1C: CPT | Performed by: FAMILY MEDICINE

## 2023-10-11 PROCEDURE — 99214 OFFICE O/P EST MOD 30 MIN: CPT | Performed by: FAMILY MEDICINE

## 2023-10-11 RX ORDER — ERGOCALCIFEROL 1.25 MG/1
50000 CAPSULE ORAL WEEKLY
Qty: 4 CAPSULE | Refills: 3 | Status: SHIPPED | OUTPATIENT
Start: 2023-10-11

## 2023-10-18 NOTE — PROGRESS NOTES
Objective   Physical Exam  Vitals and nursing note reviewed. Constitutional:       Appearance: Normal appearance. She is well-developed. HENT:      Head: Normocephalic and atraumatic. Right Ear: External ear normal.      Left Ear: External ear normal.      Nose: Nose normal.   Eyes:      Conjunctiva/sclera: Conjunctivae normal.      Pupils: Pupils are equal, round, and reactive to light. Cardiovascular:      Rate and Rhythm: Normal rate and regular rhythm. Heart sounds: Normal heart sounds. Pulmonary:      Effort: Pulmonary effort is normal.      Breath sounds: Normal breath sounds. Abdominal:      General: Bowel sounds are normal.      Palpations: Abdomen is soft. Musculoskeletal:         General: Normal range of motion. Cervical back: Normal range of motion and neck supple. Skin:     General: Skin is dry. Neurological:      Mental Status: She is alert and oriented to person, place, and time. Deep Tendon Reflexes: Reflexes are normal and symmetric. Psychiatric:         Behavior: Behavior normal.         Thought Content: Thought content normal.         Judgment: Judgment normal.                An electronic signature was used to authenticate this note.     --Norm Schrader MD

## 2023-10-24 ENCOUNTER — OFFICE VISIT (OUTPATIENT)
Dept: FAMILY MEDICINE CLINIC | Age: 53
End: 2023-10-24
Payer: MEDICARE

## 2023-10-24 VITALS
DIASTOLIC BLOOD PRESSURE: 72 MMHG | WEIGHT: 272 LBS | OXYGEN SATURATION: 97 % | SYSTOLIC BLOOD PRESSURE: 126 MMHG | HEIGHT: 67 IN | HEART RATE: 92 BPM | BODY MASS INDEX: 42.69 KG/M2

## 2023-10-24 DIAGNOSIS — F17.200 TOBACCO DEPENDENCE: ICD-10-CM

## 2023-10-24 DIAGNOSIS — R30.0 DYSURIA: Primary | ICD-10-CM

## 2023-10-24 DIAGNOSIS — R51.9 ACUTE NONINTRACTABLE HEADACHE, UNSPECIFIED HEADACHE TYPE: ICD-10-CM

## 2023-10-24 DIAGNOSIS — R05.1 ACUTE COUGH: ICD-10-CM

## 2023-10-24 DIAGNOSIS — R52 BODY ACHES: ICD-10-CM

## 2023-10-24 LAB
BILIRUBIN, POC: NORMAL
BLOOD URINE, POC: NORMAL
CLARITY, POC: NORMAL
COLOR, POC: NORMAL
GLUCOSE URINE, POC: NEGATIVE
INFLUENZA A ANTIGEN, POC: NEGATIVE
INFLUENZA B ANTIGEN, POC: POSITIVE
KETONES, POC: NEGATIVE
LEUKOCYTE EST, POC: NORMAL
LOT EXPIRE DATE: NORMAL
LOT KIT NUMBER: NORMAL
NITRITE, POC: POSITIVE
PH, POC: 5.5
PROTEIN, POC: NORMAL
SARS-COV-2, POC: NORMAL
SPECIFIC GRAVITY, POC: >=1.03
UROBILINOGEN, POC: NORMAL
VALID INTERNAL CONTROL: NORMAL
VENDOR AND KIT NAME POC: NORMAL

## 2023-10-24 PROCEDURE — 3078F DIAST BP <80 MM HG: CPT | Performed by: NURSE PRACTITIONER

## 2023-10-24 PROCEDURE — 87428 SARSCOV & INF VIR A&B AG IA: CPT | Performed by: NURSE PRACTITIONER

## 2023-10-24 PROCEDURE — 99214 OFFICE O/P EST MOD 30 MIN: CPT | Performed by: NURSE PRACTITIONER

## 2023-10-24 PROCEDURE — 3074F SYST BP LT 130 MM HG: CPT | Performed by: NURSE PRACTITIONER

## 2023-10-24 PROCEDURE — 81002 URINALYSIS NONAUTO W/O SCOPE: CPT | Performed by: NURSE PRACTITIONER

## 2023-10-24 RX ORDER — VARENICLINE TARTRATE 25 MG
1 KIT ORAL DAILY
Qty: 1 EACH | Refills: 0 | Status: SHIPPED | OUTPATIENT
Start: 2023-10-24

## 2023-10-24 RX ORDER — BENZONATATE 100 MG/1
100 CAPSULE ORAL 2 TIMES DAILY PRN
Qty: 20 CAPSULE | Refills: 0 | Status: SHIPPED | OUTPATIENT
Start: 2023-10-24 | End: 2023-11-03

## 2023-10-24 RX ORDER — NITROFURANTOIN 25; 75 MG/1; MG/1
100 CAPSULE ORAL 2 TIMES DAILY
Qty: 14 CAPSULE | Refills: 0 | Status: SHIPPED | OUTPATIENT
Start: 2023-10-24 | End: 2023-10-31

## 2023-10-24 ASSESSMENT — ENCOUNTER SYMPTOMS
DIARRHEA: 1
NAUSEA: 0
COUGH: 1
SORE THROAT: 1

## 2023-10-24 NOTE — PROGRESS NOTES
Patient: Rosenda Stock is a 48 y.o. female who presents today with the following Chief Complaint(s):  Chief Complaint   Patient presents with    Cough     X 1 week    Urinary Frequency     X 2 days    Nausea    Lower Back Pain    Other     Would like chantix sent into pharmacy          HPI  Tobacco abuse: would like to try chantix again to quit smoking    Cough, body aches, headache, chills, sweats, bilateral ear pain, congestion, sore throat- been going on for a week- states she has taken some OTC cough meds that have not helped- states tessalon pearls help       Dysuria, urinary urgency, frequency, nausea, bilateral flank pain for 2 days  Current Outpatient Medications   Medication Sig Dispense Refill    Varenicline Tartrate, Starter, (CHANTIX STARTING MONTH PAK) 0.5 MG X 11 & 1 MG X 42 TBPK Take 1 each by mouth daily As directed on box.  1 each 0    nitrofurantoin, macrocrystal-monohydrate, (MACROBID) 100 MG capsule Take 1 capsule by mouth 2 times daily for 7 days 14 capsule 0    benzonatate (TESSALON) 100 MG capsule Take 1 capsule by mouth 2 times daily as needed for Cough 20 capsule 0    vitamin D (ERGOCALCIFEROL) 1.25 MG (77814 UT) CAPS capsule Take 1 capsule by mouth once a week 4 capsule 3    lisinopril (PRINIVIL;ZESTRIL) 40 MG tablet TAKE 1 TABLET DAILY 30 tablet 3    LYUMJEV KWIKPEN 100 UNIT/ML SOPN       ipratropium 0.5 mg-albuterol 2.5 mg (DUONEB) 0.5-2.5 (3) MG/3ML SOLN nebulizer solution Inhale 3 mLs into the lungs every 4 hours 360 mL 0    Respiratory Therapy Supplies (NEBULIZER/TUBING/MOUTHPIECE) KIT 1 kit by Does not apply route daily as needed (wheezing, shortness of breath) 1 kit 0    citalopram (CELEXA) 40 MG tablet Take 1 tablet by mouth daily 90 tablet 1    ibuprofen (ADVIL;MOTRIN) 600 MG tablet Take 1 tablet by mouth 3 times daily (with meals) for 7 days 21 tablet 0    Dulaglutide (TRULICITY) 7.83 SP/3.2IO SOPN Inject 0.75 mg into the skin once a week 4 Adjustable Dose Pre-filled Pen Syringe 0

## 2023-10-26 LAB
BACTERIA UR CULT: ABNORMAL
ORGANISM: ABNORMAL

## 2023-11-16 ENCOUNTER — OFFICE VISIT (OUTPATIENT)
Dept: FAMILY MEDICINE CLINIC | Age: 53
End: 2023-11-16
Payer: MEDICARE

## 2023-11-16 VITALS
OXYGEN SATURATION: 97 % | SYSTOLIC BLOOD PRESSURE: 126 MMHG | DIASTOLIC BLOOD PRESSURE: 72 MMHG | HEIGHT: 67 IN | HEART RATE: 98 BPM | BODY MASS INDEX: 42.85 KG/M2 | WEIGHT: 273 LBS

## 2023-11-16 DIAGNOSIS — I10 HYPERTENSION, UNSPECIFIED TYPE: ICD-10-CM

## 2023-11-16 DIAGNOSIS — J01.90 ACUTE BACTERIAL SINUSITIS: Primary | ICD-10-CM

## 2023-11-16 DIAGNOSIS — B96.89 ACUTE BACTERIAL SINUSITIS: Primary | ICD-10-CM

## 2023-11-16 PROCEDURE — 99214 OFFICE O/P EST MOD 30 MIN: CPT | Performed by: FAMILY MEDICINE

## 2023-11-16 PROCEDURE — 3078F DIAST BP <80 MM HG: CPT | Performed by: FAMILY MEDICINE

## 2023-11-16 PROCEDURE — 3074F SYST BP LT 130 MM HG: CPT | Performed by: FAMILY MEDICINE

## 2023-11-16 RX ORDER — VALSARTAN 160 MG/1
160 TABLET ORAL DAILY
Qty: 90 TABLET | Refills: 1 | Status: SHIPPED | OUTPATIENT
Start: 2023-11-16

## 2023-11-16 RX ORDER — AMOXICILLIN AND CLAVULANATE POTASSIUM 875; 125 MG/1; MG/1
1 TABLET, FILM COATED ORAL 2 TIMES DAILY
Qty: 20 TABLET | Refills: 0 | Status: SHIPPED | OUTPATIENT
Start: 2023-11-16 | End: 2023-11-26

## 2023-11-16 RX ORDER — BROMPHENIRAMINE MALEATE, PSEUDOEPHEDRINE HYDROCHLORIDE, AND DEXTROMETHORPHAN HYDROBROMIDE 2; 30; 10 MG/5ML; MG/5ML; MG/5ML
5 SYRUP ORAL 4 TIMES DAILY PRN
Qty: 120 ML | Refills: 0 | Status: SHIPPED | OUTPATIENT
Start: 2023-11-16

## 2023-11-16 RX ORDER — FEXOFENADINE HCL 180 MG/1
180 TABLET ORAL DAILY
COMMUNITY

## 2023-11-26 ASSESSMENT — ENCOUNTER SYMPTOMS: COUGH: 1

## 2023-12-28 ENCOUNTER — TELEPHONE (OUTPATIENT)
Dept: FAMILY MEDICINE CLINIC | Age: 53
End: 2023-12-28

## 2023-12-28 ENCOUNTER — TELEMEDICINE (OUTPATIENT)
Dept: FAMILY MEDICINE CLINIC | Age: 53
End: 2023-12-28
Payer: MEDICARE

## 2023-12-28 DIAGNOSIS — B96.89 ACUTE BACTERIAL SINUSITIS: Primary | ICD-10-CM

## 2023-12-28 DIAGNOSIS — J01.90 ACUTE BACTERIAL SINUSITIS: Primary | ICD-10-CM

## 2023-12-28 PROCEDURE — 99213 OFFICE O/P EST LOW 20 MIN: CPT | Performed by: FAMILY MEDICINE

## 2023-12-28 RX ORDER — BROMPHENIRAMINE MALEATE, PSEUDOEPHEDRINE HYDROCHLORIDE, AND DEXTROMETHORPHAN HYDROBROMIDE 2; 30; 10 MG/5ML; MG/5ML; MG/5ML
5 SYRUP ORAL 4 TIMES DAILY PRN
Qty: 120 ML | Refills: 0 | Status: SHIPPED | OUTPATIENT
Start: 2023-12-28

## 2023-12-28 RX ORDER — AMOXICILLIN AND CLAVULANATE POTASSIUM 875; 125 MG/1; MG/1
1 TABLET, FILM COATED ORAL 2 TIMES DAILY
Qty: 20 TABLET | Refills: 0 | Status: SHIPPED | OUTPATIENT
Start: 2023-12-28 | End: 2024-01-07

## 2023-12-28 NOTE — PROGRESS NOTES
Eduardo Scruggs, was evaluated through a synchronous (real-time) audio-video encounter. The patient (or guardian if applicable) is aware that this is a billable service, which includes applicable co-pays. This Virtual Visit was conducted with patient's (and/or legal guardian's) consent. Patient identification was verified, and a caregiver was present when appropriate.   The patient was located at Home: [address] [place], [place] [id number]  Provider was located at Facility (Appt Dept): [address] [place], [place] [address] [place] [phone]      Eduardo Scruggs (:  1970) is a Established patient, presenting virtually for evaluation of the following:    Assessment & Plan   Below is the assessment and plan developed based on review of pertinent history, physical exam, labs, studies, and medications.  1. Acute bacterial sinusitis  -     amoxicillin-clavulanate (AUGMENTIN) 875-125 MG per tablet; Take 1 tablet by mouth 2 times daily for 10 days, Disp-20 tablet, R-0Normal  -     brompheniramine-pseudoephedrine-DM 2-30-10 MG/5ML syrup; Take 5 mLs by mouth 4 times daily as needed for Congestion or Cough, Disp-120 mL, R-0Normal    No follow-ups on file.       Subjective   Eduardo Scruggs is a 53 y.o. female. Patient presents with:  Congestion  Cough  Otalgia: X 3 days      Sinus pressure, congestion for over 7 days. Now with otalgia x 3 days. Notes muffled sound. Cough somewhat productive. Slight sore throat. No fevers chills or myalgia. Notes no n/v/d.  The patients PMH, surgical history, family history, medications, allergies were all reviewed and updated as appropriate today.        Review of Systems       Objective   Patient-Reported Vitals  Patient-Reported Weight: 272 lbs  Patient-Reported Height: 5'7\"       Physical Exam  [INSTRUCTIONS:  \"[x]\" Indicates a positive item  \"[]\" Indicates a negative item  -- DELETE ALL ITEMS NOT EXAMINED]    Constitutional: [x] Appears well-developed and well-nourished [x] No apparent distress      []

## 2023-12-28 NOTE — TELEPHONE ENCOUNTER
Sinus drainage, congestion, believes she has a double ear infection, cough, fatigue, x2-3 days. Patient absolutely refuses to go to urgent care as she says she has to pay to go there she doesn't have to pay to come here. I asked patient if she at least has a covid text at home to rule that out, pt states she does not but she knows it's not covid. She said it's a sinus infection and ear infection and wants to have Dr. Abena Montgomery try to see her or just send something in.

## 2024-01-11 ENCOUNTER — OFFICE VISIT (OUTPATIENT)
Dept: FAMILY MEDICINE CLINIC | Age: 54
End: 2024-01-11
Payer: MEDICARE

## 2024-01-11 VITALS
DIASTOLIC BLOOD PRESSURE: 70 MMHG | OXYGEN SATURATION: 96 % | WEIGHT: 279 LBS | BODY MASS INDEX: 43.79 KG/M2 | HEART RATE: 87 BPM | SYSTOLIC BLOOD PRESSURE: 134 MMHG | HEIGHT: 67 IN

## 2024-01-11 DIAGNOSIS — E11.42 TYPE 2 DIABETES MELLITUS WITH DIABETIC POLYNEUROPATHY, WITHOUT LONG-TERM CURRENT USE OF INSULIN (HCC): Primary | ICD-10-CM

## 2024-01-11 DIAGNOSIS — I10 HYPERTENSION, UNSPECIFIED TYPE: ICD-10-CM

## 2024-01-11 DIAGNOSIS — F41.1 GAD (GENERALIZED ANXIETY DISORDER): ICD-10-CM

## 2024-01-11 LAB — HBA1C MFR BLD: 9.1 %

## 2024-01-11 PROCEDURE — 99214 OFFICE O/P EST MOD 30 MIN: CPT | Performed by: FAMILY MEDICINE

## 2024-01-11 PROCEDURE — G8427 DOCREV CUR MEDS BY ELIG CLIN: HCPCS | Performed by: FAMILY MEDICINE

## 2024-01-11 PROCEDURE — 2022F DILAT RTA XM EVC RTNOPTHY: CPT | Performed by: FAMILY MEDICINE

## 2024-01-11 PROCEDURE — G8482 FLU IMMUNIZE ORDER/ADMIN: HCPCS | Performed by: FAMILY MEDICINE

## 2024-01-11 PROCEDURE — G8417 CALC BMI ABV UP PARAM F/U: HCPCS | Performed by: FAMILY MEDICINE

## 2024-01-11 PROCEDURE — 83036 HEMOGLOBIN GLYCOSYLATED A1C: CPT | Performed by: FAMILY MEDICINE

## 2024-01-11 PROCEDURE — 3078F DIAST BP <80 MM HG: CPT | Performed by: FAMILY MEDICINE

## 2024-01-11 PROCEDURE — 4004F PT TOBACCO SCREEN RCVD TLK: CPT | Performed by: FAMILY MEDICINE

## 2024-01-11 PROCEDURE — 3075F SYST BP GE 130 - 139MM HG: CPT | Performed by: FAMILY MEDICINE

## 2024-01-11 PROCEDURE — 3046F HEMOGLOBIN A1C LEVEL >9.0%: CPT | Performed by: FAMILY MEDICINE

## 2024-01-11 PROCEDURE — 3017F COLORECTAL CA SCREEN DOC REV: CPT | Performed by: FAMILY MEDICINE

## 2024-01-11 RX ORDER — CITALOPRAM 40 MG/1
40 TABLET ORAL DAILY
Qty: 90 TABLET | Refills: 1 | Status: SHIPPED | OUTPATIENT
Start: 2024-01-11

## 2024-01-11 RX ORDER — DULAGLUTIDE 1.5 MG/.5ML
1.5 INJECTION, SOLUTION SUBCUTANEOUS WEEKLY
Qty: 2 ML | Refills: 2 | Status: SHIPPED | OUTPATIENT
Start: 2024-01-11

## 2024-01-11 RX ORDER — BUPROPION HYDROCHLORIDE 150 MG/1
150 TABLET ORAL EVERY MORNING
Qty: 90 TABLET | Refills: 1 | Status: SHIPPED | OUTPATIENT
Start: 2024-01-11 | End: 2024-01-12 | Stop reason: SDUPTHER

## 2024-01-11 ASSESSMENT — PATIENT HEALTH QUESTIONNAIRE - PHQ9
10. IF YOU CHECKED OFF ANY PROBLEMS, HOW DIFFICULT HAVE THESE PROBLEMS MADE IT FOR YOU TO DO YOUR WORK, TAKE CARE OF THINGS AT HOME, OR GET ALONG WITH OTHER PEOPLE: 1
SUM OF ALL RESPONSES TO PHQ QUESTIONS 1-9: 16
5. POOR APPETITE OR OVEREATING: 3
1. LITTLE INTEREST OR PLEASURE IN DOING THINGS: 1
3. TROUBLE FALLING OR STAYING ASLEEP: 3
9. THOUGHTS THAT YOU WOULD BE BETTER OFF DEAD, OR OF HURTING YOURSELF: 0
SUM OF ALL RESPONSES TO PHQ QUESTIONS 1-9: 16
SUM OF ALL RESPONSES TO PHQ QUESTIONS 1-9: 16
8. MOVING OR SPEAKING SO SLOWLY THAT OTHER PEOPLE COULD HAVE NOTICED. OR THE OPPOSITE, BEING SO FIGETY OR RESTLESS THAT YOU HAVE BEEN MOVING AROUND A LOT MORE THAN USUAL: 1
7. TROUBLE CONCENTRATING ON THINGS, SUCH AS READING THE NEWSPAPER OR WATCHING TELEVISION: 1
2. FEELING DOWN, DEPRESSED OR HOPELESS: 1
4. FEELING TIRED OR HAVING LITTLE ENERGY: 3
SUM OF ALL RESPONSES TO PHQ9 QUESTIONS 1 & 2: 2
SUM OF ALL RESPONSES TO PHQ QUESTIONS 1-9: 16
6. FEELING BAD ABOUT YOURSELF - OR THAT YOU ARE A FAILURE OR HAVE LET YOURSELF OR YOUR FAMILY DOWN: 3

## 2024-01-12 ENCOUNTER — TELEPHONE (OUTPATIENT)
Dept: FAMILY MEDICINE CLINIC | Age: 54
End: 2024-01-12

## 2024-01-12 DIAGNOSIS — F41.1 GAD (GENERALIZED ANXIETY DISORDER): ICD-10-CM

## 2024-01-12 DIAGNOSIS — E11.42 TYPE 2 DIABETES MELLITUS WITH DIABETIC POLYNEUROPATHY, WITHOUT LONG-TERM CURRENT USE OF INSULIN (HCC): ICD-10-CM

## 2024-01-12 DIAGNOSIS — I10 HYPERTENSION, UNSPECIFIED TYPE: ICD-10-CM

## 2024-01-12 RX ORDER — VALSARTAN 160 MG/1
160 TABLET ORAL DAILY
Qty: 90 TABLET | Refills: 1 | Status: SHIPPED | OUTPATIENT
Start: 2024-01-12

## 2024-01-12 RX ORDER — DULAGLUTIDE 0.75 MG/.5ML
0.75 INJECTION, SOLUTION SUBCUTANEOUS WEEKLY
Qty: 4 ADJUSTABLE DOSE PRE-FILLED PEN SYRINGE | Refills: 0 | Status: SHIPPED | OUTPATIENT
Start: 2024-01-12

## 2024-01-12 RX ORDER — BUPROPION HYDROCHLORIDE 150 MG/1
150 TABLET ORAL EVERY MORNING
Qty: 90 TABLET | Refills: 1 | Status: SHIPPED | OUTPATIENT
Start: 2024-01-12

## 2024-01-12 NOTE — TELEPHONE ENCOUNTER
When pt was here she had her meds sent to the new mail order pharmacy but wasn't sure she had the right one. [facility] reached out and her meds need sent there.   Meds have been pended but they also requested crestor, lexapro and lyumjev these are not on her list of current meds. Please advise

## 2024-01-12 NOTE — TELEPHONE ENCOUNTER
Pt called to say that she is being prescribed the crestor and lyumjev by her endo doctor. Pt wanted to make sure it was understood that she has never taken lexapro. She stated she wanted to make sure that Dr. Ramos understands that it is celexa and not lexapro that she is getting/taking.

## 2024-01-29 DIAGNOSIS — E11.42 TYPE 2 DIABETES MELLITUS WITH DIABETIC POLYNEUROPATHY, WITHOUT LONG-TERM CURRENT USE OF INSULIN (HCC): ICD-10-CM

## 2024-01-30 RX ORDER — DULAGLUTIDE 0.75 MG/.5ML
INJECTION, SOLUTION SUBCUTANEOUS
Qty: 4 ML | Refills: 5 | OUTPATIENT
Start: 2024-01-30

## 2024-01-30 RX ORDER — DULAGLUTIDE 1.5 MG/.5ML
1.5 INJECTION, SOLUTION SUBCUTANEOUS WEEKLY
Qty: 2 ML | Refills: 5 | Status: SHIPPED | OUTPATIENT
Start: 2024-01-30

## 2024-02-14 DIAGNOSIS — F41.1 GAD (GENERALIZED ANXIETY DISORDER): ICD-10-CM

## 2024-02-14 RX ORDER — CITALOPRAM 40 MG/1
40 TABLET ORAL DAILY
Qty: 90 TABLET | Refills: 1 | Status: SHIPPED | OUTPATIENT
Start: 2024-02-14

## 2024-02-14 NOTE — TELEPHONE ENCOUNTER
Last Office Visit  -  1/11/24  Next Office Visit  -  4/18/24    Last Filled  -    Last UDS -    Contract -

## 2024-02-21 DIAGNOSIS — I10 HYPERTENSION, UNSPECIFIED TYPE: ICD-10-CM

## 2024-02-22 RX ORDER — VALSARTAN 160 MG/1
160 TABLET ORAL DAILY
Qty: 100 TABLET | Refills: 2 | Status: SHIPPED | OUTPATIENT
Start: 2024-02-22

## 2024-04-18 ENCOUNTER — OFFICE VISIT (OUTPATIENT)
Dept: FAMILY MEDICINE CLINIC | Age: 54
End: 2024-04-18

## 2024-04-18 VITALS
OXYGEN SATURATION: 96 % | WEIGHT: 272 LBS | HEIGHT: 67 IN | HEART RATE: 85 BPM | DIASTOLIC BLOOD PRESSURE: 80 MMHG | SYSTOLIC BLOOD PRESSURE: 126 MMHG | BODY MASS INDEX: 42.69 KG/M2

## 2024-04-18 DIAGNOSIS — F41.1 GAD (GENERALIZED ANXIETY DISORDER): ICD-10-CM

## 2024-04-18 DIAGNOSIS — E11.42 TYPE 2 DIABETES MELLITUS WITH DIABETIC POLYNEUROPATHY, WITHOUT LONG-TERM CURRENT USE OF INSULIN (HCC): ICD-10-CM

## 2024-04-18 DIAGNOSIS — J41.0 SMOKERS' COUGH (HCC): ICD-10-CM

## 2024-04-18 DIAGNOSIS — I10 HYPERTENSION, UNSPECIFIED TYPE: ICD-10-CM

## 2024-04-18 DIAGNOSIS — G47.30 SLEEP APNEA, UNSPECIFIED TYPE: Primary | ICD-10-CM

## 2024-04-18 RX ORDER — BUPROPION HYDROCHLORIDE 300 MG/1
300 TABLET ORAL EVERY MORNING
Qty: 30 TABLET | Refills: 3 | Status: SHIPPED | OUTPATIENT
Start: 2024-04-18

## 2024-04-18 RX ORDER — ALBUTEROL SULFATE 90 UG/1
2 AEROSOL, METERED RESPIRATORY (INHALATION) 4 TIMES DAILY PRN
Qty: 18 G | Refills: 0 | Status: SHIPPED | OUTPATIENT
Start: 2024-04-18

## 2024-04-18 RX ORDER — TRIAMCINOLONE ACETONIDE 1 MG/G
CREAM TOPICAL
Qty: 30 G | Refills: 0 | Status: SHIPPED | OUTPATIENT
Start: 2024-04-18

## 2024-04-18 SDOH — ECONOMIC STABILITY: FOOD INSECURITY: WITHIN THE PAST 12 MONTHS, YOU WORRIED THAT YOUR FOOD WOULD RUN OUT BEFORE YOU GOT MONEY TO BUY MORE.: SOMETIMES TRUE

## 2024-04-18 SDOH — ECONOMIC STABILITY: FOOD INSECURITY: WITHIN THE PAST 12 MONTHS, THE FOOD YOU BOUGHT JUST DIDN'T LAST AND YOU DIDN'T HAVE MONEY TO GET MORE.: OFTEN TRUE

## 2024-04-18 SDOH — ECONOMIC STABILITY: INCOME INSECURITY: HOW HARD IS IT FOR YOU TO PAY FOR THE VERY BASICS LIKE FOOD, HOUSING, MEDICAL CARE, AND HEATING?: HARD

## 2024-04-18 SDOH — ECONOMIC STABILITY: HOUSING INSECURITY
IN THE LAST 12 MONTHS, WAS THERE A TIME WHEN YOU DID NOT HAVE A STEADY PLACE TO SLEEP OR SLEPT IN A SHELTER (INCLUDING NOW)?: NO

## 2024-04-18 NOTE — PROGRESS NOTES
Eduardo Scruggs (:  1970) is a 53 y.o. female,Established patient, here for evaluation of the following chief complaint(s):  Diabetes (Wants sleep study )      Assessment & Plan   1. Sleep apnea, unspecified type  -     Suman Rosado MD, Sleep Medicine, [facility]  Discussed the importance of this the rest of her health.  She needs to control her sleep apnea in order to decrease insulin resistance and to increase her heart health.  2. Smokers' cough (HCC)  -     albuterol sulfate HFA (VENTOLIN HFA) 108 (90 Base) MCG/ACT inhaler; Inhale 2 puffs into the lungs 4 times daily as needed for Wheezing, Disp-18 g, R-0Normal  3. DARREN (generalized anxiety disorder)  Currently controlled with medication.  4. Type 2 diabetes mellitus with diabetic polyneuropathy, without long-term current use of insulin ([facility])  Hemoglobin A1C   Date Value Ref Range Status   2024 7.7 (H) 4.2 - 5.6 % Final     Patient has had fair control recently.  Patient needs to improve control  5. Hypertension, unspecified type  Hypertension is well-controlled.    No follow-ups on file.       Subjective   Eduardo Scruggs is a 53 y.o. female. Patient presents with:  Diabetes: Alexys sleep study       Is a 53-year-old female with a history of generalized anxiety disorder type 2 diabetes mellitus and hypertension.  Patient has been doing fairly well recently.  Patient has had no significant issues with her medications.  Patient has had to stop with the sleep apnea would like a sleep study.  Patient denies any other significant issues with this.  Patient has also had history of having a smoker's cough.  She has used albuterol with this.  This is worked well for her.  The patients PMH, surgical history, family history, medications, allergies were all reviewed and updated as appropriate today.      Diabetes        Review of Systems       Objective   Physical Exam  Vitals and nursing note reviewed.   Constitutional:       Appearance: Normal

## 2024-04-18 NOTE — PATIENT INSTRUCTIONS
[facility] Financial Resources*  (Call 211 if need more resources.)     [facility] Financial Assistance  What they offer: Financial assistance programs that are designed to assist you in finding resources that may help pay your hospital bill. Please click on the links below to learn more about the financial assistance programs available within our regions.  Phone Number: [phone]  How to apply for the [facility] Financial Assistance Program:       Option 1: To apply for financial assistance, a patient (or their family or other provider) should fill out the Financial Assistance Application. Copies of the Financial Assistance Application and the FAP may be obtained for free by calling the [facility] Customer Service department at [phone]   Option 2: The Financial Assistance Application and policy may be obtained for free by downloading a copy from the [employer] website:  https://[email or web address].com/patient-resources/financial-assistance  [facility] [employer] Partnership Program  What they offer: If financial strain is hindering your ability to meet health care needs, the [employer] Partnership Program may be able to help. It provides support to patients facing complex health issues and social barriers. These services are provided at no cost.   Eligible Patients  Adults who are at or below 200% poverty level  Uninsured, underinsured, or those at risk of losing health insurance  You may be eligible to receive:  One-on-one support enrolling in Medicaid, Marketplace health care coverage, financial assistance, and other benefit programs  Short-term or long-term case management to support and help connect you to appropriate health care services and community-based services.   Assistance with Primary care and prescription costs   referrals as a partnering agency with [employer] to support those overcoming homelessness and living in extreme poverty.   How to Contact:   Phone: Main line

## 2024-05-09 DIAGNOSIS — F41.1 GAD (GENERALIZED ANXIETY DISORDER): ICD-10-CM

## 2024-05-10 RX ORDER — BUPROPION HYDROCHLORIDE 150 MG/1
150 TABLET ORAL EVERY MORNING
Qty: 90 TABLET | Refills: 3 | OUTPATIENT
Start: 2024-05-10

## 2024-05-10 NOTE — TELEPHONE ENCOUNTER
Last Office Visit  -  4/18/24  Next Office Visit  -  8/5/24    Last Filled  -    Last UDS -    Contract -

## 2024-06-11 DIAGNOSIS — F41.1 GAD (GENERALIZED ANXIETY DISORDER): ICD-10-CM

## 2024-06-12 RX ORDER — CITALOPRAM 40 MG/1
40 TABLET ORAL DAILY
Qty: 90 TABLET | Refills: 3 | Status: SHIPPED | OUTPATIENT
Start: 2024-06-12

## 2024-06-14 ENCOUNTER — TELEMEDICINE (OUTPATIENT)
Dept: PULMONOLOGY | Age: 54
End: 2024-06-14

## 2024-06-14 ENCOUNTER — TELEPHONE (OUTPATIENT)
Dept: PULMONOLOGY | Age: 54
End: 2024-06-14

## 2024-06-14 DIAGNOSIS — Z78.9 EXCESSIVE CAFFEINE INTAKE: ICD-10-CM

## 2024-06-14 DIAGNOSIS — E66.01 OBESITY, MORBID, BMI 40.0-49.9 (HCC): ICD-10-CM

## 2024-06-14 DIAGNOSIS — G47.33 MODERATE OBSTRUCTIVE SLEEP APNEA: Primary | ICD-10-CM

## 2024-06-14 DIAGNOSIS — R53.83 OTHER FATIGUE: ICD-10-CM

## 2024-06-14 DIAGNOSIS — R51.9 MORNING HEADACHE: ICD-10-CM

## 2024-06-14 DIAGNOSIS — Z78.9 DIFFICULTY USING CONTINUOUS POSITIVE AIRWAY PRESSURE (CPAP) DEVICE: ICD-10-CM

## 2024-06-14 DIAGNOSIS — G47.30 OBSERVED SLEEP APNEA: ICD-10-CM

## 2024-06-14 DIAGNOSIS — R06.83 SNORING: ICD-10-CM

## 2024-06-14 DIAGNOSIS — G47.10 HYPERSOMNIA: ICD-10-CM

## 2024-06-14 DIAGNOSIS — I10 PRIMARY HYPERTENSION: ICD-10-CM

## 2024-06-14 DIAGNOSIS — Z72.0 TOBACCO ABUSE: ICD-10-CM

## 2024-06-14 ASSESSMENT — SLEEP AND FATIGUE QUESTIONNAIRES
HOW LIKELY ARE YOU TO NOD OFF OR FALL ASLEEP WHILE SITTING INACTIVE IN A PUBLIC PLACE: WOULD NEVER DOZE
HOW LIKELY ARE YOU TO NOD OFF OR FALL ASLEEP WHILE SITTING AND READING: SLIGHT CHANCE OF DOZING
HOW LIKELY ARE YOU TO NOD OFF OR FALL ASLEEP WHILE LYING DOWN TO REST IN THE AFTERNOON WHEN CIRCUMSTANCES PERMIT: MODERATE CHANCE OF DOZING
ESS TOTAL SCORE: 5
HOW LIKELY ARE YOU TO NOD OFF OR FALL ASLEEP IN A CAR, WHILE STOPPED FOR A FEW MINUTES IN TRAFFIC: WOULD NEVER DOZE
HOW LIKELY ARE YOU TO NOD OFF OR FALL ASLEEP WHILE SITTING QUIETLY AFTER LUNCH WITHOUT ALCOHOL: SLIGHT CHANCE OF DOZING
HOW LIKELY ARE YOU TO NOD OFF OR FALL ASLEEP WHILE WATCHING TV: SLIGHT CHANCE OF DOZING
HOW LIKELY ARE YOU TO NOD OFF OR FALL ASLEEP WHEN YOU ARE A PASSENGER IN A CAR FOR AN HOUR WITHOUT A BREAK: WOULD NEVER DOZE

## 2024-06-14 NOTE — PROGRESS NOTES
tablet, Take 1 tablet by mouth daily, Disp: , Rfl:     Blood Glucose Monitoring Suppl (FREESTYLE LITE) MIRZA, Test BS 3 times a day. On insulin Dx Code E11.9, Disp: 1 Device, Rfl: 0    FREESTYLE LANCETS MISC, Test BS 3 times a day. On insulin Dx Code E11.9, Disp: 100 each, Rfl: 3    hydrochlorothiazide (HYDRODIURIL) 50 MG tablet, Take 1 tablet by mouth daily, Disp: , Rfl:     ibuprofen (ADVIL;MOTRIN) 600 MG tablet, Take 1 tablet by mouth 3 times daily (with meals) for 7 days, Disp: 21 tablet, Rfl: 0      REVIEW OF SYSTEMS:  Review of Systems    Constitutional: Negative for fever  HENT: Negative for sore throat  Eyes: Negative for redness   Respiratory: Negative for dyspnea, cough  Cardiovascular: Negative for chest pain  Gastrointestinal: Negative for vomiting, diarrhea   Genitourinary: Negative for hematuria   Musculoskeletal: Negative forarthralgias   Skin: Negative for rash  Neurological: Negative for syncope  Hematological: Negative for adenopathy  Psychiatric/Behavorial: Negative for anxiety      Objective:   PHYSICAL EXAM:  There were no vitals taken for this visit.    Physical Exam  Exam:  Gen: No acute distress, does not appear to be in pain. Appears well developed and nourished.  HENT: Head is normocephalic and atraumatic. Normal appearing nose. External Ears normal.   Neck: No visualized mass. Trachea is midline   Eyes: EOM intact. No visible discharge.   Resp:No visualized signs of difficulty breathing or respiratory distress, speaking in full sentences.  Respiratory effort normal.  Neuro: Awake. Alert.  Able to follow commands.  No facial asymmetry.  Skin: No significant lesions or discoloration noted on facial skin    Musculoskeletal: Normal range of motion of the neck.  Psych: Oriented x 3. No anxiety. Normal affect.        DATA:   4/18/2023 echo EF 55%  4/16/2019 PSG AHI 17.6, low SpO2 74%  4/24/2019 CPAP titration recommendations for CPAP 13 cm H2O    Assessment:       Moderate AGUSTINA.  Tried and failed

## 2024-06-14 NOTE — TELEPHONE ENCOUNTER
Patient had an appt on 6- vv    Bipap titration ordered and pt need to schedule 31-90  She also need to reach out to insurance company to see which DME company is covered under her plan before we get results.         Left message for patient to call the office

## 2024-06-14 NOTE — TELEPHONE ENCOUNTER
Pt returned call and will contact insurance, and [facility]"" sleep center will reach out in the next couple days  Scheduled 31-90 appt

## 2024-07-24 ENCOUNTER — HOSPITAL ENCOUNTER (OUTPATIENT)
Dept: SLEEP CENTER | Age: 54
Discharge: HOME | End: 2024-07-26
Payer: MEDICARE

## 2024-07-24 DIAGNOSIS — R53.83 OTHER FATIGUE: ICD-10-CM

## 2024-07-24 DIAGNOSIS — R06.83 SNORING: ICD-10-CM

## 2024-07-24 DIAGNOSIS — Z78.9 EXCESSIVE CAFFEINE INTAKE: ICD-10-CM

## 2024-07-24 DIAGNOSIS — G47.10 HYPERSOMNIA: ICD-10-CM

## 2024-07-24 DIAGNOSIS — R51.9 MORNING HEADACHE: ICD-10-CM

## 2024-07-24 DIAGNOSIS — G47.30 OBSERVED SLEEP APNEA: ICD-10-CM

## 2024-07-24 DIAGNOSIS — Z78.9 DIFFICULTY USING CONTINUOUS POSITIVE AIRWAY PRESSURE (CPAP) DEVICE: ICD-10-CM

## 2024-07-24 DIAGNOSIS — I10 PRIMARY HYPERTENSION: ICD-10-CM

## 2024-07-24 DIAGNOSIS — G47.33 MODERATE OBSTRUCTIVE SLEEP APNEA: ICD-10-CM

## 2024-07-24 DIAGNOSIS — E66.01 OBESITY, MORBID, BMI 40.0-49.9 (HCC): ICD-10-CM

## 2024-07-24 DIAGNOSIS — Z72.0 TOBACCO ABUSE: ICD-10-CM

## 2024-07-24 PROCEDURE — 95811 POLYSOM 6/>YRS CPAP 4/> PARM: CPT

## 2024-07-25 PROBLEM — R53.83 OTHER FATIGUE: Status: ACTIVE | Noted: 2024-07-25

## 2024-07-25 PROBLEM — G47.30 OBSERVED SLEEP APNEA: Status: ACTIVE | Noted: 2024-07-25

## 2024-07-25 PROBLEM — Z78.9 DIFFICULTY USING CONTINUOUS POSITIVE AIRWAY PRESSURE (CPAP) DEVICE: Status: ACTIVE | Noted: 2024-07-25

## 2024-07-25 PROBLEM — R51.9 MORNING HEADACHE: Status: ACTIVE | Noted: 2024-07-25

## 2024-07-25 PROBLEM — Z78.9 EXCESSIVE CAFFEINE INTAKE: Status: ACTIVE | Noted: 2024-07-25

## 2024-07-25 PROBLEM — G47.10 HYPERSOMNIA: Status: ACTIVE | Noted: 2024-07-25

## 2024-07-26 ENCOUNTER — TELEPHONE (OUTPATIENT)
Dept: SLEEP MEDICINE | Age: 54
End: 2024-07-26

## 2024-07-26 DIAGNOSIS — G47.33 MODERATE OBSTRUCTIVE SLEEP APNEA: Primary | ICD-10-CM

## 2024-07-26 DIAGNOSIS — Z78.9 DIFFICULTY USING CONTINUOUS POSITIVE AIRWAY PRESSURE (CPAP) DEVICE: ICD-10-CM

## 2024-07-26 NOTE — TELEPHONE ENCOUNTER
Titration showed improved AGUSTINA on BiPAP however suboptimal control at final events.  Recommendations to trial BiPAP 16/12 cm H2O.  Orders placed in epic.  Please make patient aware and fax orders to DME of her choice.    31-90-day follow-up appointment will likely need pushed out

## 2024-07-29 NOTE — TELEPHONE ENCOUNTER
Spoke to patient, she is aware of the message.   She will contact insurance to see where we can send the orders to     Moved her 31-90 to 10/1/2024

## 2024-08-06 NOTE — TELEPHONE ENCOUNTER
Faxed bipap order, full face mask order, nasal mask order, PSG, bipap titration, and ov notes to [facility] at [phone] via [employer].

## 2024-09-04 ENCOUNTER — OFFICE VISIT (OUTPATIENT)
Dept: FAMILY MEDICINE CLINIC | Age: 54
End: 2024-09-04

## 2024-09-04 VITALS
HEIGHT: 67 IN | WEIGHT: 269 LBS | SYSTOLIC BLOOD PRESSURE: 134 MMHG | TEMPERATURE: 98.6 F | BODY MASS INDEX: 42.22 KG/M2 | DIASTOLIC BLOOD PRESSURE: 76 MMHG | OXYGEN SATURATION: 97 % | HEART RATE: 87 BPM

## 2024-09-04 DIAGNOSIS — J41.0 SMOKERS' COUGH (HCC): ICD-10-CM

## 2024-09-04 DIAGNOSIS — J01.90 ACUTE BACTERIAL SINUSITIS: ICD-10-CM

## 2024-09-04 DIAGNOSIS — Z00.00 WELCOME TO MEDICARE PREVENTIVE VISIT: Primary | ICD-10-CM

## 2024-09-04 DIAGNOSIS — B96.89 ACUTE BACTERIAL SINUSITIS: ICD-10-CM

## 2024-09-04 RX ORDER — AZITHROMYCIN 250 MG/1
TABLET, FILM COATED ORAL
Qty: 6 TABLET | Refills: 0 | Status: SHIPPED | OUTPATIENT
Start: 2024-09-04 | End: 2024-09-14

## 2024-09-04 RX ORDER — ALBUTEROL SULFATE 90 UG/1
2 AEROSOL, METERED RESPIRATORY (INHALATION) 4 TIMES DAILY PRN
Qty: 18 G | Refills: 0 | Status: SHIPPED | OUTPATIENT
Start: 2024-09-04

## 2024-09-04 ASSESSMENT — PATIENT HEALTH QUESTIONNAIRE - PHQ9
SUM OF ALL RESPONSES TO PHQ QUESTIONS 1-9: 7
SUM OF ALL RESPONSES TO PHQ9 QUESTIONS 1 & 2: 2
1. LITTLE INTEREST OR PLEASURE IN DOING THINGS: SEVERAL DAYS
10. IF YOU CHECKED OFF ANY PROBLEMS, HOW DIFFICULT HAVE THESE PROBLEMS MADE IT FOR YOU TO DO YOUR WORK, TAKE CARE OF THINGS AT HOME, OR GET ALONG WITH OTHER PEOPLE: NOT DIFFICULT AT ALL
8. MOVING OR SPEAKING SO SLOWLY THAT OTHER PEOPLE COULD HAVE NOTICED. OR THE OPPOSITE, BEING SO FIGETY OR RESTLESS THAT YOU HAVE BEEN MOVING AROUND A LOT MORE THAN USUAL: NOT AT ALL
3. TROUBLE FALLING OR STAYING ASLEEP: SEVERAL DAYS
2. FEELING DOWN, DEPRESSED OR HOPELESS: SEVERAL DAYS
5. POOR APPETITE OR OVEREATING: SEVERAL DAYS
6. FEELING BAD ABOUT YOURSELF - OR THAT YOU ARE A FAILURE OR HAVE LET YOURSELF OR YOUR FAMILY DOWN: SEVERAL DAYS
9. THOUGHTS THAT YOU WOULD BE BETTER OFF DEAD, OR OF HURTING YOURSELF: NOT AT ALL
SUM OF ALL RESPONSES TO PHQ QUESTIONS 1-9: 7
7. TROUBLE CONCENTRATING ON THINGS, SUCH AS READING THE NEWSPAPER OR WATCHING TELEVISION: SEVERAL DAYS
SUM OF ALL RESPONSES TO PHQ QUESTIONS 1-9: 7
4. FEELING TIRED OR HAVING LITTLE ENERGY: SEVERAL DAYS
SUM OF ALL RESPONSES TO PHQ QUESTIONS 1-9: 7

## 2024-09-04 ASSESSMENT — LIFESTYLE VARIABLES
HOW OFTEN DO YOU HAVE A DRINK CONTAINING ALCOHOL: MONTHLY OR LESS
HOW MANY STANDARD DRINKS CONTAINING ALCOHOL DO YOU HAVE ON A TYPICAL DAY: 3 OR 4

## 2024-09-04 NOTE — PATIENT INSTRUCTIONS
a safe and effective exercise program.  A counselor or psychiatrist can help you cope with issues such as depression, anxiety, or family problems that can make it hard to focus on weight loss.  Consider joining a support group for people who are trying to lose weight. Your doctor can suggest groups in your area.  Where can you learn more?  Go to https://www.[employer].net/patientEd and enter U357 to learn more about \"Starting a Weight Loss Plan: Care Instructions.\"  Current as of: September 20, 2023  Content Version: 14.1  © 6154-6153 [employer].   Care instructions adapted under license by [employer]. If you have questions about a medical condition or this instruction, always ask your healthcare professional. [employer] disclaims any warranty or liability for your use of this information.           Advance Directives: Care Instructions  Overview  An advance directive is a legal way to state your wishes at the end of your life. It tells your family and your doctor what to do if you can't say what you want.  There are two main types of advance directives. You can change them any time your wishes change.  Living will.  This form tells your family and your doctor your wishes about life support and other treatment. The form is also called a declaration.  Medical power of .  This form lets you name a person to make treatment decisions for you when you can't speak for yourself. This person is called a health care agent (health care proxy, health care surrogate). The form is also called a durable power of  for health care.  If you do not have an advance directive, decisions about your medical care may be made by a family member, or by a doctor or a  who doesn't know you.  It may help to think of an advance directive as a gift to the people who care for you. If you have one, they won't have to make tough decisions by themselves.  For more information, including forms for

## 2024-09-04 NOTE — PROGRESS NOTES
Medicare Annual Wellness Visit    Eduardo Scruggs is here for Medicare AWV and Sinus Problem (Nausea, body aches )    Assessment & Plan   Welcome to Medicare preventive visit  Acute bacterial sinusitis  -     azithromycin (ZITHROMAX) 250 MG tablet; 500mg on day 1 followed by 250mg on days 2 - 5, Disp-6 tablet, R-0Normal  Negative for COVID and flu test today.  Smokers' cough (HCC)  -     albuterol sulfate HFA (VENTOLIN HFA) 108 (90 Base) MCG/ACT inhaler; Inhale 2 puffs into the lungs 4 times daily as needed for Wheezing, Disp-18 g, R-0Normal  Recommendations for Preventive Services Due: see orders and patient instructions/AVS.  Recommended screening schedule for the next 5-10 years is provided to the patient in written form: see Patient Instructions/AVS.     No follow-ups on file.     Subjective   The following acute and/or chronic problems were also addressed today:  Feeling sick today. Hot and cold, for about two days. Headache, sore throat.  Has a productive cough at times.    Patient's complete Health Risk Assessment and screening values have been reviewed and are found in Flowsheets. The following problems were reviewed today and where indicated follow up appointments were made and/or referrals ordered.    Positive Risk Factor Screenings with Interventions:                Inactivity:  On average, how many days per week do you engage in moderate to strenuous exercise (like a brisk walk)?: 1 day (!) Abnormal  On average, how many minutes do you engage in exercise at this level?: 10 min  Interventions:  Patient declined any further interventions or treatment    Poor Eating Habits/Diet:  Do you eat balanced/healthy meals regularly?: (!) No  Interventions:  Patient declines any further evaluation or treatment    Abnormal BMI (obese):  Body mass index is 42.13 kg/m². (!) Abnormal  Interventions:  Patient declines any further evaluation or treatment        Dentist Screen:  Have you seen the dentist within the past year?:

## 2024-09-17 DIAGNOSIS — J41.0 SMOKERS' COUGH (HCC): ICD-10-CM

## 2024-09-17 RX ORDER — ALBUTEROL SULFATE 90 UG/1
2 INHALANT RESPIRATORY (INHALATION) 4 TIMES DAILY PRN
Qty: 18 G | Refills: 0 | Status: SHIPPED | OUTPATIENT
Start: 2024-09-17

## 2024-10-01 ENCOUNTER — TELEMEDICINE (OUTPATIENT)
Dept: SLEEP MEDICINE | Age: 54
End: 2024-10-01
Payer: MEDICARE

## 2024-10-01 ENCOUNTER — TELEPHONE (OUTPATIENT)
Dept: PULMONOLOGY | Age: 54
End: 2024-10-01

## 2024-10-01 DIAGNOSIS — G25.81 RLS (RESTLESS LEGS SYNDROME): ICD-10-CM

## 2024-10-01 DIAGNOSIS — G47.33 MODERATE OBSTRUCTIVE SLEEP APNEA: Primary | ICD-10-CM

## 2024-10-01 DIAGNOSIS — R53.83 OTHER FATIGUE: ICD-10-CM

## 2024-10-01 DIAGNOSIS — I10 PRIMARY HYPERTENSION: ICD-10-CM

## 2024-10-01 DIAGNOSIS — Z71.89 ENCOUNTER FOR BIPAP USE COUNSELING: ICD-10-CM

## 2024-10-01 DIAGNOSIS — E66.01 OBESITY, MORBID, BMI 40.0-49.9: ICD-10-CM

## 2024-10-01 PROCEDURE — 99214 OFFICE O/P EST MOD 30 MIN: CPT | Performed by: NURSE PRACTITIONER

## 2024-10-01 PROCEDURE — G8427 DOCREV CUR MEDS BY ELIG CLIN: HCPCS | Performed by: NURSE PRACTITIONER

## 2024-10-01 PROCEDURE — 3017F COLORECTAL CA SCREEN DOC REV: CPT | Performed by: NURSE PRACTITIONER

## 2024-10-01 RX ORDER — INSULIN LISPRO 100 [IU]/ML
INJECTION, SOLUTION INTRAVENOUS; SUBCUTANEOUS
COMMUNITY
Start: 2024-08-14

## 2024-10-01 ASSESSMENT — SLEEP AND FATIGUE QUESTIONNAIRES
ESS TOTAL SCORE: 12
HOW LIKELY ARE YOU TO NOD OFF OR FALL ASLEEP WHILE WATCHING TV: WOULD NEVER DOZE
HOW LIKELY ARE YOU TO NOD OFF OR FALL ASLEEP WHILE LYING DOWN TO REST IN THE AFTERNOON WHEN CIRCUMSTANCES PERMIT: HIGH CHANCE OF DOZING
HOW LIKELY ARE YOU TO NOD OFF OR FALL ASLEEP IN A CAR, WHILE STOPPED FOR A FEW MINUTES IN TRAFFIC: WOULD NEVER DOZE
HOW LIKELY ARE YOU TO NOD OFF OR FALL ASLEEP WHILE SITTING INACTIVE IN A PUBLIC PLACE: MODERATE CHANCE OF DOZING
HOW LIKELY ARE YOU TO NOD OFF OR FALL ASLEEP WHILE SITTING QUIETLY AFTER LUNCH WITHOUT ALCOHOL: HIGH CHANCE OF DOZING
HOW LIKELY ARE YOU TO NOD OFF OR FALL ASLEEP WHILE SITTING AND TALKING TO SOMEONE: WOULD NEVER DOZE
HOW LIKELY ARE YOU TO NOD OFF OR FALL ASLEEP WHILE SITTING AND READING: MODERATE CHANCE OF DOZING
HOW LIKELY ARE YOU TO NOD OFF OR FALL ASLEEP WHEN YOU ARE A PASSENGER IN A CAR FOR AN HOUR WITHOUT A BREAK: MODERATE CHANCE OF DOZING

## 2024-10-01 NOTE — TELEPHONE ENCOUNTER
Patient had VV with Bozena Weeks on 10/1. Needs 1 year Follow-up.    LVM for patient to call the office to scheduled her 1 year follow-up.

## 2024-10-01 NOTE — PROGRESS NOTES
Patient ID: Eduardo Scruggs is a 54 y.o. female who is being seen today for   Chief Complaint   Patient presents with    Follow-up     31-90     Referring: Dr. Dung Ramos    HPI:   Eduardo Scruggs is a 54 y.o. female for televideo appointment via video and audio virtual visit for AGUSTINA follow up.  BiPAP titration was reviewed by me and noted below.Results were dicussed with patient and multiple good questions were answered. States she is sleeping better with BIPAP, tolerating better than CPAP.  States she is sleeping better and feels more rested in the mornings with BIPAP.      PLMS on titration.  Patient states occasional RLS symptoms however has improved since using BiPAP    Patient is using BiPAP   4-7 hrs/night. Using humidifier. No snoring on BiPAP. The pressure is well tolerated. The mask is comfortable-full face. No mask leak. No significant daytime sleepiness- better since using BIPAP. No nodding off when driving. No dry nose or throat. Some fatigue. Bedtime is 9-11 pm and rise time is 530 am- takes grand daughter to school then goes back to bed sometimes.  Sleep onset is 30 minutes. Wakes up 1-2 times at night total. 1-2 nocturia. It takes few minutes to fall back a sleep. No naps during the day. No headache in am. No weight gain. 3 caffienated beverages during the day. No alcohol. ESS is 12        Initial HPI 6/14/24  Eduardo Scruggs is a 54 y.o. female  for televideo appointment via video and audio virtual visit for sleep apnea evaluation.  States she was diagnosed with sleep apnea in 2019.  States she tried CPAP but could not tolerate the pressure and had to return it.  States it was hard to breath against the pressure.    Patient reports snoring at night for the past 10-15 years. Cannot sleep in supine position. Wakes self snoring. Has witnessed apnea.  No restorative sleep. +dry mouth upon awakening. Fatigue and tiredness during the day.  Bedtime 11 pm-2 am and rise time is 7-9 am. It takes 15-20 minutes to fall

## 2024-11-29 DIAGNOSIS — I10 HYPERTENSION, UNSPECIFIED TYPE: ICD-10-CM

## 2024-12-01 RX ORDER — VALSARTAN 160 MG/1
160 TABLET ORAL DAILY
Qty: 100 TABLET | Refills: 2 | Status: SHIPPED | OUTPATIENT
Start: 2024-12-01

## 2024-12-29 ENCOUNTER — TELEPHONE (OUTPATIENT)
Dept: PHARMACY | Facility: CLINIC | Age: 54
End: 2024-12-29

## 2024-12-30 ENCOUNTER — OFFICE VISIT (OUTPATIENT)
Dept: PRIMARY CARE CLINIC | Age: 54
End: 2024-12-30
Payer: MEDICARE

## 2024-12-30 VITALS
BODY MASS INDEX: 42.13 KG/M2 | DIASTOLIC BLOOD PRESSURE: 74 MMHG | WEIGHT: 269 LBS | HEART RATE: 74 BPM | SYSTOLIC BLOOD PRESSURE: 120 MMHG | OXYGEN SATURATION: 98 % | TEMPERATURE: 98.2 F

## 2024-12-30 DIAGNOSIS — R06.89 DECREASED LUNG SOUNDS: ICD-10-CM

## 2024-12-30 DIAGNOSIS — R30.0 DYSURIA: Primary | ICD-10-CM

## 2024-12-30 DIAGNOSIS — R09.81 SINUS CONGESTION: ICD-10-CM

## 2024-12-30 LAB
BILIRUBIN, POC: NEGATIVE
BLOOD URINE, POC: NORMAL
CLARITY, POC: NORMAL
COLOR, POC: NORMAL
GLUCOSE URINE, POC: NEGATIVE MG/DL
KETONES, POC: NEGATIVE MG/DL
LEUKOCYTE EST, POC: NORMAL
NITRITE, POC: NEGATIVE
PH, POC: 5.5
PROTEIN, POC: NEGATIVE MG/DL
SPECIFIC GRAVITY, POC: 1.03
UROBILINOGEN, POC: 0.2 MG/DL

## 2024-12-30 PROCEDURE — G8427 DOCREV CUR MEDS BY ELIG CLIN: HCPCS

## 2024-12-30 PROCEDURE — 99213 OFFICE O/P EST LOW 20 MIN: CPT

## 2024-12-30 PROCEDURE — G8417 CALC BMI ABV UP PARAM F/U: HCPCS

## 2024-12-30 PROCEDURE — 4004F PT TOBACCO SCREEN RCVD TLK: CPT

## 2024-12-30 PROCEDURE — 81002 URINALYSIS NONAUTO W/O SCOPE: CPT

## 2024-12-30 PROCEDURE — 3074F SYST BP LT 130 MM HG: CPT

## 2024-12-30 PROCEDURE — G8484 FLU IMMUNIZE NO ADMIN: HCPCS

## 2024-12-30 PROCEDURE — 3017F COLORECTAL CA SCREEN DOC REV: CPT

## 2024-12-30 PROCEDURE — 3078F DIAST BP <80 MM HG: CPT

## 2024-12-30 RX ORDER — FLUTICASONE PROPIONATE 50 MCG
2 SPRAY, SUSPENSION (ML) NASAL DAILY
Qty: 16 G | Refills: 0 | Status: SHIPPED | OUTPATIENT
Start: 2024-12-30

## 2024-12-30 RX ORDER — CIPROFLOXACIN 500 MG/1
500 TABLET, FILM COATED ORAL 2 TIMES DAILY
Qty: 14 TABLET | Refills: 0 | Status: SHIPPED | OUTPATIENT
Start: 2024-12-30 | End: 2025-01-06

## 2024-12-30 NOTE — TELEPHONE ENCOUNTER
[facility] CLINICAL PHARMACY: STATIN THERAPY REVIEW  Identified statin use in persons with diabetes (SUPD) care gap per [facility]. Records dated: 24    Allergies   Allergen Reactions    Latex Hives    Hydromorphone Nausea And Vomiting     Severe illness  Nausea     Iodides     Codeine      Upsets her abd     Janumet [Sitagliptin-Metformin Hcl] Other (See Comments)     URI    Metformin And Related Diarrhea    Morphine And Codeine     Other      Dye   Dye     Phenytoin Hives    Dilaudid [Hydromorphone Hcl] Nausea And Vomiting     Severe illness    Doxycycline Nausea Only and Nausea And Vomiting    Morphine Nausea And Vomiting and Other (See Comments)     Headache and sweats     STATIN GAP IDENTIFIED    Insurance Records claims through 24: Prior Year PDC = not reported; YTD PDC = no fills     Last rosuvastatin 5mg every other day Rx sent on 2023 for 90ds with 3 refills per [facility] - now     Per Reconciled Dispense Report as of 2024:  Rosuvastatin filled [id number]    Lab Results   Component Value Date/Time    CHOL 110 2024 02:30 PM    TRIG 112 2024 02:30 PM    HDL 44 2024 02:30 PM    LDL 46 2024 02:30 PM    VLDL 28 2018 09:48 AM    ALT 12 2024 02:30 PM    AST 11 2024 02:30 PM      The ASCVD Risk score (Karen DESAI, et al., 2019) failed to calculate for the following reasons:    The valid total cholesterol range is 130 to 320 mg/dL     BP Readings from Last 3 Encounters:   24 120/74   24 134/76   24 126/80     Lab Results   Component Value Date    LABA1C 8.6 (H) 2024    LABA1C 7.7 (H) 2024    LABA1C 7.7 (H) 2024     Lab Results   Component Value Date/Time    LABGLOM >60 2023 01:30 AM    LABGLOM >60 2021 03:03 PM    LABGLOM >60 2020 06:44 PM    LABGLOM >60 2019 11:35 PM    LABGLOM >60 2018 09:48 AM     Lab Results   Component Value Date    CREATININE 0.69 2024     CrCl

## 2024-12-30 NOTE — PROGRESS NOTES
PROGRESS NOTE   [facility]                                  [address], [address], [facility] [address]         Phone: [phone]    Date of Service:  12/30/2024     Patient ID: Eduardo Scruggs is a 54 y.o. female      Subjective:     CC: Urinary Tract Infection (Possible sinus infection and freq urination since yesterday also says painful. Dark urine with blood spots)      HPI  Patient is a 54 y.o. year old female that has Diabetes mellitus without complication (HCC); Hypertension; Hyperlipidemia; Obesity, morbid, BMI 40.0-49.9; Plaque psoriasis; Vitamin D deficiency; Tobacco dependence; Type 2 diabetes mellitus with diabetic polyneuropathy, without long-term current use of insulin ([facility]); Moderate obstructive sleep apnea; Difficulty using continuous positive airway pressure (CPAP) device; Excessive caffeine intake; Hypersomnia; Morning headache; Other fatigue; and Observed sleep apnea on their problem list..  Patient is presenting for above mentioned concerns.    Patient is present today due to concerns of blood in urine as well as pain and frequency with urination that started yesterday.  She states that she did recently get treated for UTI through her OB/GYN, with cultures.  She states that she has not tried any medications for relief of symptoms.  She also notes that she has been experiencing sinus pressure with congestion and headaches that started about 2 days ago.  She denies any fevers at home as she has not measured temperatures at home, but she does state that she has had positive sick contacts at home as her granddaughters have been sick with otitis media as well as cough and congestion.  Denies any additional concerns at this time.    ROS:  A comprehensive review of systems was negative except for what was noted in the HPI.    Vitals:    12/30/24 1630   BP: 120/74   Site: Left Upper Arm   Position: Sitting   Cuff Size: Medium

## 2024-12-31 LAB
BACTERIA URNS QL MICRO: ABNORMAL /HPF
BILIRUB UR QL STRIP.AUTO: NEGATIVE
CLARITY UR: ABNORMAL
COLOR UR: ABNORMAL
EPI CELLS #/AREA URNS AUTO: 4 /HPF (ref 0–5)
GLUCOSE UR STRIP.AUTO-MCNC: NEGATIVE MG/DL
HGB UR QL STRIP.AUTO: ABNORMAL
HYALINE CASTS #/AREA URNS AUTO: 0 /LPF (ref 0–8)
KETONES UR STRIP.AUTO-MCNC: NEGATIVE MG/DL
LEUKOCYTE ESTERASE UR QL STRIP.AUTO: ABNORMAL
NITRITE UR QL STRIP.AUTO: NEGATIVE
PH UR STRIP.AUTO: 5.5 [PH] (ref 5–8)
PROT UR STRIP.AUTO-MCNC: ABNORMAL MG/DL
RBC CLUMPS #/AREA URNS AUTO: 37 /HPF (ref 0–4)
SP GR UR STRIP.AUTO: 1.02 (ref 1–1.03)
UA DIPSTICK W REFLEX MICRO PNL UR: YES
URN SPEC COLLECT METH UR: ABNORMAL
UROBILINOGEN UR STRIP-ACNC: 1 E.U./DL
WBC #/AREA URNS AUTO: 133 /HPF (ref 0–5)

## 2025-01-02 LAB
BACTERIA UR CULT: ABNORMAL
ORGANISM: ABNORMAL

## 2025-01-09 ENCOUNTER — APPOINTMENT (OUTPATIENT)
Dept: GENERAL RADIOLOGY | Age: 55
End: 2025-01-09
Payer: MEDICARE

## 2025-01-09 ENCOUNTER — HOSPITAL ENCOUNTER (EMERGENCY)
Age: 55
Discharge: HOME | End: 2025-01-09
Payer: MEDICARE

## 2025-01-09 VITALS
DIASTOLIC BLOOD PRESSURE: 90 MMHG | TEMPERATURE: 97.9 F | WEIGHT: 281.4 LBS | OXYGEN SATURATION: 97 % | SYSTOLIC BLOOD PRESSURE: 180 MMHG | RESPIRATION RATE: 16 BRPM | BODY MASS INDEX: 44.17 KG/M2 | HEIGHT: 67 IN | HEART RATE: 67 BPM

## 2025-01-09 DIAGNOSIS — M25.532 LEFT WRIST PAIN: Primary | ICD-10-CM

## 2025-01-09 PROCEDURE — 6370000000 HC RX 637 (ALT 250 FOR IP): Performed by: PHYSICIAN ASSISTANT

## 2025-01-09 PROCEDURE — 73130 X-RAY EXAM OF HAND: CPT

## 2025-01-09 PROCEDURE — 29125 APPL SHORT ARM SPLINT STATIC: CPT

## 2025-01-09 PROCEDURE — 73110 X-RAY EXAM OF WRIST: CPT

## 2025-01-09 PROCEDURE — 99283 EMERGENCY DEPT VISIT LOW MDM: CPT

## 2025-01-09 RX ORDER — OXYCODONE AND ACETAMINOPHEN 5; 325 MG/1; MG/1
1 TABLET ORAL ONCE
Status: COMPLETED | OUTPATIENT
Start: 2025-01-09 | End: 2025-01-09

## 2025-01-09 RX ORDER — OXYCODONE AND ACETAMINOPHEN 5; 325 MG/1; MG/1
1 TABLET ORAL EVERY 6 HOURS PRN
Qty: 12 TABLET | Refills: 0 | Status: SHIPPED | OUTPATIENT
Start: 2025-01-09 | End: 2025-01-12

## 2025-01-09 RX ADMIN — OXYCODONE AND ACETAMINOPHEN 1 TABLET: 5; 325 TABLET ORAL at 14:11

## 2025-01-09 ASSESSMENT — LIFESTYLE VARIABLES
HOW OFTEN DO YOU HAVE A DRINK CONTAINING ALCOHOL: NEVER
HOW MANY STANDARD DRINKS CONTAINING ALCOHOL DO YOU HAVE ON A TYPICAL DAY: PATIENT DOES NOT DRINK

## 2025-01-09 ASSESSMENT — PAIN SCALES - GENERAL: PAINLEVEL_OUTOF10: 9

## 2025-01-09 ASSESSMENT — PAIN - FUNCTIONAL ASSESSMENT: PAIN_FUNCTIONAL_ASSESSMENT: 0-10

## 2025-01-09 NOTE — ED PROVIDER NOTES
[facility] EMERGENCY DEPARTMENT  EMERGENCY DEPARTMENT ENCOUNTER        Pt Name: Eduardo Scruggs  MRN: [id number]  Birthdate 1970  Date of evaluation: 1/9/2025  Provider: CLARITA Rice  PCP: Dung Ramos MD  Note Started: 3:46 PM EST 1/9/25      ADY. I have evaluated this patient.        CHIEF COMPLAINT       Chief Complaint   Patient presents with    Wrist Pain     Left wrist pain and swelling since yesterday. Denies known injury, reports that granddaughter told her she punched a wall while asleep.        HISTORY OF PRESENT ILLNESS: 1 or more Elements     History from : Patient    Limitations to history : None    Eduardo Scruggs is a 54 y.o. female who presents to the urgency department complaining of left wrist pain.  Patient states pain began after waking up yesterday.  She denies any known injuries.  She reports that her granddaughter had reported the patient had punched the wall in her sleep.  She states it was after this that she woke up with pain in her wrist and hand.  Pain is worse when flexing and extending her wrist and moving her thumb.  There is no overlying erythema.  Denies any IV drug use.  No recent fevers or chills.  No nausea or vomiting.  She has tried taking ibuprofen and Tylenol without significant relief.  No other injuries.    Nursing Notes were all reviewed and agreed with or any disagreements were addressed in the HPI.    REVIEW OF SYSTEMS :      Review of Systems    Positives and Pertinent negatives as per HPI.     SURGICAL HISTORY     Past Surgical History:   Procedure Laterality Date    ABDOMEN SURGERY      bladder mesh on March 5 2019    APPENDECTOMY      BACK SURGERY      CHOLECYSTECTOMY      DILATION AND CURETTAGE OF UTERUS      ENDOMETRIAL ABLATION      INCONTINENCE SURGERY      MIDDLE EAR SURGERY  10/2002    left ossicular reconstrucion    TUBAL LIGATION         CURRENTMEDICATIONS       Previous Medications    ALBUTEROL SULFATE HFA (VENTOLIN HFA) 108 (90 BASE)  doctor or the referral orthopedist. We also discussed returning to the Emergency Department immediately if new or worsening symptoms occur. We have discussed the symptoms which are most concerning (e.g., changing or worsening pain, numbness, weakness) that necessitate immediate return.    Final Impression    1. Left wrist pain        Blood pressure (!) 180/90, pulse 67, temperature 97.9 °F (36.6 °C), temperature source Oral, resp. rate 16, height 1.702 m (5' 7\"), weight 127.6 kg (281 lb 6.4 oz), SpO2 97%, not currently breastfeeding.          I am the Primary Clinician of Record.    FINAL IMPRESSION      1. Left wrist pain          DISPOSITION/PLAN     DISPOSITION Decision To Discharge 01/09/2025 03:46:27 PM      PATIENT REFERRED TO:  [facility] Emergency Department  [address]  [facility] [address] [phone]  Go to   If symptoms worsen    Cameron Talbert MD  [address] [facility] [address]  [facility] [id number] [phone]    Schedule an appointment as soon as possible for a visit         DISCHARGE MEDICATIONS:  Discharge Medication List as of 1/9/2025  4:05 PM        START taking these medications    Details   oxyCODONE-acetaminophen (PERCOCET) 5-325 MG per tablet Take 1 tablet by mouth every 6 hours as needed for Pain for up to 3 days. Intended supply: 3 days. Take lowest dose possible to manage pain Max Daily Amount: 4 tablets, Disp-12 tablet, R-0Normal             DISCONTINUED MEDICATIONS:  Discharge Medication List as of 1/9/2025  4:05 PM                 (Please note that portions of this note were completed with a voice recognition program.  Efforts were made to edit the dictations but occasionally words are mis-transcribed.)    CLARITA Rice (electronically signed)            Harmony Swift PA  01/13/25 5719

## 2025-01-09 NOTE — DISCHARGE INSTRUCTIONS
You were seen in the emergency department today for wrist pain.  We did x-rays of your wrist and hand which were reassuring.  I do have some concern for possible scaphoid injury therefore you were placed in a splint.  Please follow-up with orthopedics for further evaluation and treatment.  You may take ibuprofen or Tylenol for pain as needed, I have prescribed a short course of Percocet for breakthrough pain.

## 2025-03-04 ENCOUNTER — OFFICE VISIT (OUTPATIENT)
Dept: FAMILY MEDICINE CLINIC | Age: 55
End: 2025-03-04
Payer: MEDICARE

## 2025-03-04 VITALS
BODY MASS INDEX: 44.26 KG/M2 | SYSTOLIC BLOOD PRESSURE: 146 MMHG | HEIGHT: 67 IN | WEIGHT: 282 LBS | HEART RATE: 73 BPM | DIASTOLIC BLOOD PRESSURE: 84 MMHG | OXYGEN SATURATION: 97 %

## 2025-03-04 DIAGNOSIS — E11.42 TYPE 2 DIABETES MELLITUS WITH DIABETIC POLYNEUROPATHY, WITHOUT LONG-TERM CURRENT USE OF INSULIN (HCC): Primary | ICD-10-CM

## 2025-03-04 DIAGNOSIS — E66.01 OBESITY, MORBID, BMI 40.0-49.9: ICD-10-CM

## 2025-03-04 DIAGNOSIS — I10 HYPERTENSION, UNSPECIFIED TYPE: ICD-10-CM

## 2025-03-04 LAB — HBA1C MFR BLD: 7.3 %

## 2025-03-04 PROCEDURE — 99214 OFFICE O/P EST MOD 30 MIN: CPT | Performed by: FAMILY MEDICINE

## 2025-03-04 PROCEDURE — G8417 CALC BMI ABV UP PARAM F/U: HCPCS | Performed by: FAMILY MEDICINE

## 2025-03-04 PROCEDURE — 83036 HEMOGLOBIN GLYCOSYLATED A1C: CPT | Performed by: FAMILY MEDICINE

## 2025-03-04 PROCEDURE — 3079F DIAST BP 80-89 MM HG: CPT | Performed by: FAMILY MEDICINE

## 2025-03-04 PROCEDURE — G8427 DOCREV CUR MEDS BY ELIG CLIN: HCPCS | Performed by: FAMILY MEDICINE

## 2025-03-04 PROCEDURE — 4004F PT TOBACCO SCREEN RCVD TLK: CPT | Performed by: FAMILY MEDICINE

## 2025-03-04 PROCEDURE — 3017F COLORECTAL CA SCREEN DOC REV: CPT | Performed by: FAMILY MEDICINE

## 2025-03-04 PROCEDURE — 3077F SYST BP >= 140 MM HG: CPT | Performed by: FAMILY MEDICINE

## 2025-03-04 PROCEDURE — G2211 COMPLEX E/M VISIT ADD ON: HCPCS | Performed by: FAMILY MEDICINE

## 2025-03-04 PROCEDURE — 2022F DILAT RTA XM EVC RTNOPTHY: CPT | Performed by: FAMILY MEDICINE

## 2025-03-04 PROCEDURE — 3051F HG A1C>EQUAL 7.0%<8.0%: CPT | Performed by: FAMILY MEDICINE

## 2025-03-04 RX ORDER — VALSARTAN AND HYDROCHLOROTHIAZIDE 160; 25 MG/1; MG/1
1 TABLET ORAL DAILY
Qty: 90 TABLET | Refills: 3 | Status: SHIPPED | OUTPATIENT
Start: 2025-03-04 | End: 2026-03-04

## 2025-03-04 RX ORDER — INSULIN LISPRO 100 [IU]/ML
INJECTION, SOLUTION INTRAVENOUS; SUBCUTANEOUS
Qty: 15 ADJUSTABLE DOSE PRE-FILLED PEN SYRINGE | Refills: 5 | Status: SHIPPED | OUTPATIENT
Start: 2025-03-04

## 2025-03-04 SDOH — ECONOMIC STABILITY: INCOME INSECURITY: IN THE LAST 12 MONTHS, WAS THERE A TIME WHEN YOU WERE NOT ABLE TO PAY THE MORTGAGE OR RENT ON TIME?: NO

## 2025-03-04 SDOH — ECONOMIC STABILITY: FOOD INSECURITY: WITHIN THE PAST 12 MONTHS, YOU WORRIED THAT YOUR FOOD WOULD RUN OUT BEFORE YOU GOT MONEY TO BUY MORE.: NEVER TRUE

## 2025-03-04 SDOH — ECONOMIC STABILITY: TRANSPORTATION INSECURITY
IN THE PAST 12 MONTHS, HAS LACK OF TRANSPORTATION KEPT YOU FROM MEETINGS, WORK, OR FROM GETTING THINGS NEEDED FOR DAILY LIVING?: NO

## 2025-03-04 SDOH — ECONOMIC STABILITY: FOOD INSECURITY: WITHIN THE PAST 12 MONTHS, THE FOOD YOU BOUGHT JUST DIDN'T LAST AND YOU DIDN'T HAVE MONEY TO GET MORE.: NEVER TRUE

## 2025-03-04 SDOH — ECONOMIC STABILITY: TRANSPORTATION INSECURITY
IN THE PAST 12 MONTHS, HAS THE LACK OF TRANSPORTATION KEPT YOU FROM MEDICAL APPOINTMENTS OR FROM GETTING MEDICATIONS?: NO

## 2025-03-04 ASSESSMENT — PATIENT HEALTH QUESTIONNAIRE - PHQ9
SUM OF ALL RESPONSES TO PHQ9 QUESTIONS 1 & 2: 2
SUM OF ALL RESPONSES TO PHQ QUESTIONS 1-9: 2
1. LITTLE INTEREST OR PLEASURE IN DOING THINGS: SEVERAL DAYS
SUM OF ALL RESPONSES TO PHQ QUESTIONS 1-9: 2
2. FEELING DOWN, DEPRESSED OR HOPELESS: SEVERAL DAYS
1. LITTLE INTEREST OR PLEASURE IN DOING THINGS: SEVERAL DAYS
2. FEELING DOWN, DEPRESSED OR HOPELESS: SEVERAL DAYS
SUM OF ALL RESPONSES TO PHQ QUESTIONS 1-9: 2
SUM OF ALL RESPONSES TO PHQ QUESTIONS 1-9: 2

## 2025-03-04 NOTE — PROGRESS NOTES
Eduardo Scruggs (:  1970) is a 54 y.o. female,Established patient, here for evaluation of the following chief complaint(s):  6 Month Follow-Up, Diabetes, and Weight Loss         Assessment & Plan  1. Hypertension.  Her blood pressure readings have been consistently elevated, with home measurements ranging from 140/99 to 160/101. She reports occasional headaches and foot swelling but no chest pain. The current medication regimen will be adjusted from valsartan to valsartan hydrochlorothiazide 160/25 mg, to be taken in the morning. This change aims to lower her blood pressure and reduce the observed pitting edema around her ankles. She is advised to ensure adequate water intake while on this medication.    2. Type 2 Diabetes Mellitus.  Her A1c level is 7.3, indicating good control of her diabetes. She has been using a continuous glucose monitor and reports most of her blood sugar levels are under 200, despite a recent cortisone shot that temporarily elevated her levels. A prescription for Mounjaro will be provided, starting at a lower dose with a gradual increase, to aid in weight loss and diabetes management. Additionally, a refill for Humalog will be sent to her pharmacy, with a baseline dosage of 20 units and a sliding scale up to a maximum of 60 units per day.    3. Obesity.  Despite efforts to modify her diet and reduce soda intake, she has not experienced significant weight loss. Mounjaro will be prescribed to assist with weight loss. She is encouraged to maintain physical activity and monitor her diet.    PROCEDURE  Colonoscopy was performed last Friday, during which one polyp was removed and diagnosed as cancerous.  1. Type 2 diabetes mellitus with diabetic polyneuropathy, without long-term current use of insulin ([facility])    - POCT glycosylated hemoglobin (Hb A1C)  - insulin lispro, 1 Unit Dial, (HUMALOG/ADMELOG) 100 UNIT/ML SOPN; 20 units baseline with sliding scale with maximum of 60 units per day.

## 2025-03-15 ENCOUNTER — HOSPITAL ENCOUNTER (EMERGENCY)
Age: 55
Discharge: HOME | End: 2025-03-15
Attending: STUDENT IN AN ORGANIZED HEALTH CARE EDUCATION/TRAINING PROGRAM
Payer: MEDICARE

## 2025-03-15 VITALS
HEART RATE: 78 BPM | DIASTOLIC BLOOD PRESSURE: 78 MMHG | OXYGEN SATURATION: 98 % | TEMPERATURE: 97.9 F | RESPIRATION RATE: 15 BRPM | SYSTOLIC BLOOD PRESSURE: 153 MMHG

## 2025-03-15 DIAGNOSIS — J06.9 VIRAL URI WITH COUGH: ICD-10-CM

## 2025-03-15 DIAGNOSIS — H73.012 BULLOUS MYRINGITIS OF LEFT EAR: Primary | ICD-10-CM

## 2025-03-15 DIAGNOSIS — H10.32 ACUTE CONJUNCTIVITIS OF LEFT EYE, UNSPECIFIED ACUTE CONJUNCTIVITIS TYPE: ICD-10-CM

## 2025-03-15 LAB
FLUAV RNA RESP QL NAA+PROBE: NOT DETECTED
FLUBV RNA RESP QL NAA+PROBE: NOT DETECTED
S PYO AG THROAT QL: NEGATIVE
SARS-COV-2 RNA RESP QL NAA+PROBE: NOT DETECTED

## 2025-03-15 PROCEDURE — 87636 SARSCOV2 & INF A&B AMP PRB: CPT

## 2025-03-15 PROCEDURE — 99283 EMERGENCY DEPT VISIT LOW MDM: CPT

## 2025-03-15 PROCEDURE — 87880 STREP A ASSAY W/OPTIC: CPT

## 2025-03-15 RX ORDER — AMOXICILLIN 875 MG/1
875 TABLET, COATED ORAL 2 TIMES DAILY
Qty: 10 TABLET | Refills: 0 | Status: SHIPPED | OUTPATIENT
Start: 2025-03-15 | End: 2025-03-20

## 2025-03-15 RX ORDER — AMOXICILLIN 875 MG/1
875 TABLET, COATED ORAL 2 TIMES DAILY
Qty: 10 TABLET | Refills: 0 | Status: SHIPPED | OUTPATIENT
Start: 2025-03-15 | End: 2025-03-15

## 2025-03-15 RX ORDER — ERYTHROMYCIN 5 MG/G
OINTMENT OPHTHALMIC
Qty: 1 EACH | Refills: 0 | Status: SHIPPED | OUTPATIENT
Start: 2025-03-15 | End: 2025-03-25

## 2025-03-15 RX ORDER — ERYTHROMYCIN 5 MG/G
OINTMENT OPHTHALMIC
Qty: 1 EACH | Refills: 0 | Status: SHIPPED | OUTPATIENT
Start: 2025-03-15 | End: 2025-03-15

## 2025-03-15 ASSESSMENT — LIFESTYLE VARIABLES
HOW MANY STANDARD DRINKS CONTAINING ALCOHOL DO YOU HAVE ON A TYPICAL DAY: 1 OR 2
HOW OFTEN DO YOU HAVE A DRINK CONTAINING ALCOHOL: MONTHLY OR LESS

## 2025-03-15 ASSESSMENT — PAIN - FUNCTIONAL ASSESSMENT: PAIN_FUNCTIONAL_ASSESSMENT: NONE - DENIES PAIN

## 2025-03-15 NOTE — ED PROVIDER NOTES
(FREESTYLE LITE) strip USE TO TEST BLOOD GLUCOSE LEVELS THREE TIMES A DAY, Disp-50 strip,R-4Normal      insulin glargine (LANTUS SOLOSTAR) 100 UNIT/ML injection pen Inject 80 Units into the skin 2 times daily, Disp-10 pen, R-3Normal      Insulin Pen Needle ([employer] PEN NEEDLES 31G) 31G X 8 MM MISC 4 TIMES DAILY Starting Wed 7/3/2019, Disp-130 each, R-10, NormalDISPENSE ANY INSURANCE COVERED BRAND       aspirin 81 MG tablet Take 1 tablet by mouth dailyHistorical Med      Blood Glucose Monitoring Suppl (FREESTYLE LITE) MIRZA Disp-1 Device, R-0, NormalTest BS 3 times a day. On insulin Dx Code E11.9      FREESTYLE LANCETS MISC Disp-100 each, R-3, NormalTest BS 3 times a day. On insulin Dx Code E11.9      hydrochlorothiazide (HYDRODIURIL) 50 MG tablet Take 1 tablet by mouth dailyHistorical Med            SCREENINGS          Joanne Coma Scale  Eye Opening: Spontaneous  Best Verbal Response: Oriented  Best Motor Response: Obeys commands  Joanne Coma Scale Score: 15                CIWA Assessment  BP: (!) 153/78  Pulse: 78                  PHYSICAL EXAM :  ED Triage Vitals   BP Systolic BP Percentile Diastolic BP Percentile Temp Temp Source Pulse Respirations SpO2   03/15/25 0543 -- -- 03/15/25 0543 03/15/25 0543 03/15/25 0540 03/15/25 0540 03/15/25 0540   (!) 153/78   97.9 °F (36.6 °C) Oral 78 15 98 %      Height Weight         -- --                      GENERAL APPEARANCE: Awake and alert. Cooperative. No acute distress.  HEAD: Normocephalic. Atraumatic.  EYES: PERRL. EOM's grossly intact. L eye with conjunctival injection and mild crusting.  No pain with extraocular movements.  No periorbital swelling.  ENT: Mucous membranes are moist.  No tonsillar swelling or exudates.  No uvular deviation.  No difficulty speaking or tolerating secretions.  Left TM with evidence of bullous myringitis.  Right TM is clear.  No mastoid tenderness.  NECK: Supple, trachea midline.   HEART: RRR. Normal S1, S2. No murmurs, rubs or gallops.  mastoid tenderness.  She has no tonsillar swelling or exudates.  COVID and flu were obtained which were negative as well as strep.  Lungs clear to auscultation bilaterally, do not suspect pneumonia.  Patient will be started on antibiotics for bullous myringitis.  Also found to have conjunctivitis of the left eye and will be started on antibiotics, does not wear contact lenses.  Patient is comfortable in agreement with plan of care and will be discharged.  Given return precautions for any new or worsening symptoms       Patient was given the following medications:   Medications - No data to display     CONSULTS:   None   Discussion with Other Professionals: None   Social Determinants: None   Chronic Conditions: Diabetes, hypertension  Records Reviewed: NA    I am the Primary Clinician of Record.        FINAL IMPRESSION    1. Bullous myringitis of left ear    2. Acute conjunctivitis of left eye, unspecified acute conjunctivitis type    3. Viral URI with cough           DISPOSITION/PLAN:     Appropriate for outpatient management    Disposition Considerations:     Pt discharged home in stable condition.     PATIENT REFERRED TO:   Dung Ramos MD  [address] [facility] [address] [phone]    Schedule an appointment as soon as possible for a visit in 1 week  As needed    [facility] Emergency Department  [address] [facility] [address] [phone]  Go to   If symptoms worsen       DISCHARGE MEDICATIONS:   Discharge Medication List as of 3/15/2025  6:29 AM           DISCONTINUED MEDICATIONS:   Discharge Medication List as of 3/15/2025  6:29 AM                 (Please note that portions of this note were completed with a voice recognition program.  Efforts were made to edit the dictations but occasionally words are mis-transcribed.)       Mary Huertas MD (electronically signed)             Mary Huertas MD  03/15/25 0741

## 2025-03-31 ENCOUNTER — PATIENT MESSAGE (OUTPATIENT)
Dept: FAMILY MEDICINE CLINIC | Age: 55
End: 2025-03-31

## 2025-03-31 DIAGNOSIS — E11.42 TYPE 2 DIABETES MELLITUS WITH DIABETIC POLYNEUROPATHY, WITHOUT LONG-TERM CURRENT USE OF INSULIN (HCC): ICD-10-CM

## 2025-03-31 DIAGNOSIS — E66.01 OBESITY, MORBID, BMI 40.0-49.9: ICD-10-CM

## 2025-04-08 ENCOUNTER — OFFICE VISIT (OUTPATIENT)
Dept: FAMILY MEDICINE CLINIC | Age: 55
End: 2025-04-08
Payer: MEDICARE

## 2025-04-08 VITALS
SYSTOLIC BLOOD PRESSURE: 122 MMHG | BODY MASS INDEX: 45.3 KG/M2 | WEIGHT: 288.6 LBS | DIASTOLIC BLOOD PRESSURE: 66 MMHG | OXYGEN SATURATION: 97 % | HEART RATE: 80 BPM | HEIGHT: 67 IN

## 2025-04-08 DIAGNOSIS — M54.89 OTHER CHRONIC BACK PAIN: ICD-10-CM

## 2025-04-08 DIAGNOSIS — Z79.4 TYPE 2 DIABETES MELLITUS WITH HYPERGLYCEMIA, WITH LONG-TERM CURRENT USE OF INSULIN (HCC): Primary | ICD-10-CM

## 2025-04-08 DIAGNOSIS — G89.29 OTHER CHRONIC BACK PAIN: ICD-10-CM

## 2025-04-08 DIAGNOSIS — E11.65 TYPE 2 DIABETES MELLITUS WITH HYPERGLYCEMIA, WITH LONG-TERM CURRENT USE OF INSULIN (HCC): Primary | ICD-10-CM

## 2025-04-08 PROCEDURE — 3074F SYST BP LT 130 MM HG: CPT | Performed by: NURSE PRACTITIONER

## 2025-04-08 PROCEDURE — G8417 CALC BMI ABV UP PARAM F/U: HCPCS | Performed by: NURSE PRACTITIONER

## 2025-04-08 PROCEDURE — 3051F HG A1C>EQUAL 7.0%<8.0%: CPT | Performed by: NURSE PRACTITIONER

## 2025-04-08 PROCEDURE — 3017F COLORECTAL CA SCREEN DOC REV: CPT | Performed by: NURSE PRACTITIONER

## 2025-04-08 PROCEDURE — 99214 OFFICE O/P EST MOD 30 MIN: CPT | Performed by: NURSE PRACTITIONER

## 2025-04-08 PROCEDURE — 4004F PT TOBACCO SCREEN RCVD TLK: CPT | Performed by: NURSE PRACTITIONER

## 2025-04-08 PROCEDURE — 2022F DILAT RTA XM EVC RTNOPTHY: CPT | Performed by: NURSE PRACTITIONER

## 2025-04-08 PROCEDURE — 3078F DIAST BP <80 MM HG: CPT | Performed by: NURSE PRACTITIONER

## 2025-04-08 PROCEDURE — G8427 DOCREV CUR MEDS BY ELIG CLIN: HCPCS | Performed by: NURSE PRACTITIONER

## 2025-04-08 RX ORDER — PREGABALIN 75 MG/1
75 CAPSULE ORAL 2 TIMES DAILY
COMMUNITY
Start: 2025-03-06

## 2025-04-08 RX ORDER — MELOXICAM 15 MG/1
15 TABLET ORAL DAILY
Qty: 30 TABLET | Refills: 3 | Status: SHIPPED | OUTPATIENT
Start: 2025-04-08

## 2025-04-08 ASSESSMENT — ENCOUNTER SYMPTOMS: BACK PAIN: 1

## 2025-04-08 NOTE — PROGRESS NOTES
to Dr. Ramos about that at her appt in June  Continue current insulin regimen   Orders:    Semaglutide,0.25 or 0.5MG/DOS, 2 MG/3ML SOPN; Inject 0.5 mg into the skin every 7 days    Other chronic back pain   Chronic, not at goal (unstable), changes made today: try mobic and see spine specialist- discussed she may need to see [facility] again or pain management  but will have our ortho evaluate     Orders:    Bautista Guzman PA,  Orthopedic Surgery (Spine), [facility]    meloxicam (MOBIC) 15 MG tablet; Take 1 tablet by mouth daily           No follow-ups on file.

## 2025-04-15 ENCOUNTER — OFFICE VISIT (OUTPATIENT)
Dept: ORTHOPEDIC SURGERY | Age: 55
End: 2025-04-15
Payer: MEDICARE

## 2025-04-15 VITALS — WEIGHT: 288 LBS | BODY MASS INDEX: 45.2 KG/M2 | HEIGHT: 67 IN

## 2025-04-15 DIAGNOSIS — M47.816 LUMBAR SPONDYLOSIS: ICD-10-CM

## 2025-04-15 DIAGNOSIS — M47.812 CERVICAL SPONDYLOSIS: ICD-10-CM

## 2025-04-15 DIAGNOSIS — M54.2 CERVICAL PAIN: Primary | ICD-10-CM

## 2025-04-15 DIAGNOSIS — Z98.1 STATUS POST LUMBAR SPINAL FUSION: ICD-10-CM

## 2025-04-15 DIAGNOSIS — M54.50 LUMBAR PAIN: ICD-10-CM

## 2025-04-15 PROCEDURE — 3017F COLORECTAL CA SCREEN DOC REV: CPT | Performed by: PHYSICIAN ASSISTANT

## 2025-04-15 PROCEDURE — 99204 OFFICE O/P NEW MOD 45 MIN: CPT | Performed by: PHYSICIAN ASSISTANT

## 2025-04-15 PROCEDURE — 4004F PT TOBACCO SCREEN RCVD TLK: CPT | Performed by: PHYSICIAN ASSISTANT

## 2025-04-15 PROCEDURE — G8417 CALC BMI ABV UP PARAM F/U: HCPCS | Performed by: PHYSICIAN ASSISTANT

## 2025-04-15 PROCEDURE — G8427 DOCREV CUR MEDS BY ELIG CLIN: HCPCS | Performed by: PHYSICIAN ASSISTANT

## 2025-04-15 NOTE — PROGRESS NOTES
instability.  There are no rashes, ulcerations or lesions. Strength and tone are normal.  LEFT LOWER EXTREMITY:  Inspection/examination of the left lower extremity does not show any tenderness, deformity or injury. Range of motion is unremarkable. There is no gross instability. There are no rashes, ulcerations or lesions.  Strength and tone are normal.    Diagnostic Testing:  X-rays: 2 views of the cervical spine include AP and lateral and 2 views of the thoracolumbar spine to include AP and lateral were obtained today in the office and independently reviewed with the patient.  Cervical x-rays show well-maintained cervical lordosis with well-maintained vertebral heights.  There is mild cervical spondylosis noted.  X-rays of the lumbar spine reveal spinal fusion from L3-S1 with well-maintained disc space at L2-3.    Impression:     1. Cervical pain    2. Lumbar pain    3. Status post lumbar spinal fusion    4. Lumbar spondylosis    5. Cervical spondylosis        Plan:      We discussed treatment options including observation, physical therapy, epidural injections, or additional imaging. She wishes to proceed with referral to pain management for their continued evaluation care for her ongoing cervical and lumbar spine pain.  I did offer physical therapy for both her cervical and lumbar spine and she declined.  She has discontinued the meloxicam due to gastric upset..    Follow Up: No follow up needed.    Old records were reviewed.    Bautista Cox PA-C  Senior Physician Assistant  Board certified by the [facility]  [facility]

## 2025-04-23 DIAGNOSIS — F41.1 GAD (GENERALIZED ANXIETY DISORDER): ICD-10-CM

## 2025-04-24 RX ORDER — CITALOPRAM HYDROBROMIDE 40 MG/1
40 TABLET ORAL DAILY
Qty: 90 TABLET | Refills: 3 | Status: SHIPPED | OUTPATIENT
Start: 2025-04-24

## 2025-04-24 NOTE — TELEPHONE ENCOUNTER
Last Office Visit  -  04/08/2025  Next Office Visit  -  06/05/2025    Last Filled  -    Last UDS -    Contract -

## 2025-05-02 ENCOUNTER — PATIENT MESSAGE (OUTPATIENT)
Dept: FAMILY MEDICINE CLINIC | Age: 55
End: 2025-05-02

## 2025-05-02 DIAGNOSIS — E11.65 TYPE 2 DIABETES MELLITUS WITH HYPERGLYCEMIA, WITH LONG-TERM CURRENT USE OF INSULIN (HCC): ICD-10-CM

## 2025-05-02 DIAGNOSIS — Z79.4 TYPE 2 DIABETES MELLITUS WITH HYPERGLYCEMIA, WITH LONG-TERM CURRENT USE OF INSULIN (HCC): ICD-10-CM

## 2025-05-02 RX ORDER — SEMAGLUTIDE 0.68 MG/ML
INJECTION, SOLUTION SUBCUTANEOUS
Qty: 3 ML | Refills: 0 | OUTPATIENT
Start: 2025-05-02

## 2025-05-02 NOTE — TELEPHONE ENCOUNTER
Last Office Visit  -  01/08/2025  Next Office Visit  -  06/05/2025    Last Filled  -    Last UDS -    Contract -

## 2025-06-05 ENCOUNTER — OFFICE VISIT (OUTPATIENT)
Dept: FAMILY MEDICINE CLINIC | Age: 55
End: 2025-06-05
Payer: MEDICARE

## 2025-06-05 VITALS
BODY MASS INDEX: 45.83 KG/M2 | OXYGEN SATURATION: 93 % | HEIGHT: 67 IN | HEART RATE: 76 BPM | DIASTOLIC BLOOD PRESSURE: 70 MMHG | WEIGHT: 292 LBS | SYSTOLIC BLOOD PRESSURE: 130 MMHG

## 2025-06-05 DIAGNOSIS — G89.29 OTHER CHRONIC BACK PAIN: ICD-10-CM

## 2025-06-05 DIAGNOSIS — F41.1 GAD (GENERALIZED ANXIETY DISORDER): ICD-10-CM

## 2025-06-05 DIAGNOSIS — Z79.4 TYPE 2 DIABETES MELLITUS WITH HYPERGLYCEMIA, WITH LONG-TERM CURRENT USE OF INSULIN (HCC): Primary | ICD-10-CM

## 2025-06-05 DIAGNOSIS — F32.A DEPRESSION, UNSPECIFIED DEPRESSION TYPE: ICD-10-CM

## 2025-06-05 DIAGNOSIS — E11.65 TYPE 2 DIABETES MELLITUS WITH HYPERGLYCEMIA, WITH LONG-TERM CURRENT USE OF INSULIN (HCC): Primary | ICD-10-CM

## 2025-06-05 DIAGNOSIS — M54.89 OTHER CHRONIC BACK PAIN: ICD-10-CM

## 2025-06-05 PROCEDURE — 3078F DIAST BP <80 MM HG: CPT | Performed by: FAMILY MEDICINE

## 2025-06-05 PROCEDURE — G8417 CALC BMI ABV UP PARAM F/U: HCPCS | Performed by: FAMILY MEDICINE

## 2025-06-05 PROCEDURE — 3017F COLORECTAL CA SCREEN DOC REV: CPT | Performed by: FAMILY MEDICINE

## 2025-06-05 PROCEDURE — 3051F HG A1C>EQUAL 7.0%<8.0%: CPT | Performed by: FAMILY MEDICINE

## 2025-06-05 PROCEDURE — 3075F SYST BP GE 130 - 139MM HG: CPT | Performed by: FAMILY MEDICINE

## 2025-06-05 PROCEDURE — G2211 COMPLEX E/M VISIT ADD ON: HCPCS | Performed by: FAMILY MEDICINE

## 2025-06-05 PROCEDURE — 99214 OFFICE O/P EST MOD 30 MIN: CPT | Performed by: FAMILY MEDICINE

## 2025-06-05 PROCEDURE — G8427 DOCREV CUR MEDS BY ELIG CLIN: HCPCS | Performed by: FAMILY MEDICINE

## 2025-06-05 PROCEDURE — 4004F PT TOBACCO SCREEN RCVD TLK: CPT | Performed by: FAMILY MEDICINE

## 2025-06-05 PROCEDURE — 2022F DILAT RTA XM EVC RTNOPTHY: CPT | Performed by: FAMILY MEDICINE

## 2025-06-05 RX ORDER — BUPROPION HYDROCHLORIDE 300 MG/1
300 TABLET ORAL EVERY MORNING
Qty: 90 TABLET | Refills: 3 | Status: SHIPPED | OUTPATIENT
Start: 2025-06-05

## 2025-06-27 ENCOUNTER — RESULTS FOLLOW-UP (OUTPATIENT)
Age: 55
End: 2025-06-27

## 2025-07-05 DIAGNOSIS — Z79.4 TYPE 2 DIABETES MELLITUS WITH HYPERGLYCEMIA, WITH LONG-TERM CURRENT USE OF INSULIN (HCC): ICD-10-CM

## 2025-07-05 DIAGNOSIS — E11.65 TYPE 2 DIABETES MELLITUS WITH HYPERGLYCEMIA, WITH LONG-TERM CURRENT USE OF INSULIN (HCC): ICD-10-CM

## 2025-07-07 RX ORDER — SEMAGLUTIDE 1.34 MG/ML
INJECTION, SOLUTION SUBCUTANEOUS
Qty: 9 ML | Refills: 0 | Status: SHIPPED | OUTPATIENT
Start: 2025-07-07

## 2025-08-22 ENCOUNTER — OFFICE VISIT (OUTPATIENT)
Dept: FAMILY MEDICINE CLINIC | Age: 55
End: 2025-08-22

## 2025-08-22 VITALS
DIASTOLIC BLOOD PRESSURE: 80 MMHG | HEIGHT: 67 IN | HEART RATE: 86 BPM | OXYGEN SATURATION: 98 % | BODY MASS INDEX: 45.83 KG/M2 | SYSTOLIC BLOOD PRESSURE: 126 MMHG | WEIGHT: 292 LBS

## 2025-08-22 DIAGNOSIS — R31.9 URINARY TRACT INFECTION WITH HEMATURIA, SITE UNSPECIFIED: Primary | ICD-10-CM

## 2025-08-22 DIAGNOSIS — N39.0 URINARY TRACT INFECTION WITH HEMATURIA, SITE UNSPECIFIED: Primary | ICD-10-CM

## 2025-08-22 LAB
BILIRUBIN, POC: ABNORMAL
BLOOD URINE, POC: ABNORMAL
CLARITY, POC: ABNORMAL
COLOR, POC: ABNORMAL
GLUCOSE URINE, POC: NEGATIVE MG/DL
KETONES, POC: ABNORMAL MG/DL
LEUKOCYTE EST, POC: ABNORMAL
NITRITE, POC: POSITIVE
PH, POC: 6
PROTEIN, POC: >=300 MG/DL
SPECIFIC GRAVITY, POC: 1.03
UROBILINOGEN, POC: 1 MG/DL

## 2025-08-22 RX ORDER — PHENAZOPYRIDINE HYDROCHLORIDE 100 MG/1
100 TABLET, FILM COATED ORAL 3 TIMES DAILY PRN
Qty: 6 TABLET | Refills: 0 | Status: SHIPPED | OUTPATIENT
Start: 2025-08-22

## 2025-08-22 RX ORDER — HYDROCODONE BITARTRATE AND ACETAMINOPHEN 5; 325 MG/1; MG/1
1 TABLET ORAL 2 TIMES DAILY PRN
COMMUNITY
Start: 2025-08-22 | End: 2025-09-21

## 2025-08-22 RX ORDER — NITROFURANTOIN 25; 75 MG/1; MG/1
100 CAPSULE ORAL 2 TIMES DAILY
Qty: 14 CAPSULE | Refills: 0 | Status: SHIPPED | OUTPATIENT
Start: 2025-08-22 | End: 2025-08-29

## 2025-08-22 ASSESSMENT — ENCOUNTER SYMPTOMS
GASTROINTESTINAL NEGATIVE: 1
RESPIRATORY NEGATIVE: 1

## 2025-08-24 LAB — BACTERIA UR CULT: NORMAL
